# Patient Record
Sex: MALE | Race: WHITE | URBAN - METROPOLITAN AREA
[De-identification: names, ages, dates, MRNs, and addresses within clinical notes are randomized per-mention and may not be internally consistent; named-entity substitution may affect disease eponyms.]

---

## 2018-03-28 ENCOUNTER — TRANSFERRED RECORDS (OUTPATIENT)
Dept: HEALTH INFORMATION MANAGEMENT | Facility: CLINIC | Age: 63
End: 2018-03-28

## 2018-03-30 ENCOUNTER — OFFICE VISIT (OUTPATIENT)
Dept: NEUROSURGERY | Facility: CLINIC | Age: 63
End: 2018-03-30
Attending: NEUROLOGICAL SURGERY
Payer: COMMERCIAL

## 2018-03-30 VITALS
BODY MASS INDEX: 23 KG/M2 | RESPIRATION RATE: 16 BRPM | TEMPERATURE: 97.7 F | OXYGEN SATURATION: 96 % | HEIGHT: 71 IN | DIASTOLIC BLOOD PRESSURE: 58 MMHG | HEART RATE: 78 BPM | SYSTOLIC BLOOD PRESSURE: 144 MMHG | WEIGHT: 164.3 LBS

## 2018-03-30 DIAGNOSIS — C71.1 MALIGNANT NEOPLASM OF FRONTAL LOBE OF BRAIN (H): Primary | ICD-10-CM

## 2018-03-30 DIAGNOSIS — G93.89 BRAIN MASS: Primary | ICD-10-CM

## 2018-03-30 PROCEDURE — G0463 HOSPITAL OUTPT CLINIC VISIT: HCPCS

## 2018-03-30 RX ORDER — CYCLOSPORINE 100 MG/1
25 CAPSULE, GELATIN COATED ORAL 2 TIMES DAILY
Status: ON HOLD | COMMUNITY
End: 2018-04-18

## 2018-03-30 ASSESSMENT — PAIN SCALES - GENERAL: PAINLEVEL: NO PAIN (0)

## 2018-03-30 NOTE — MR AVS SNAPSHOT
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  Normal or non-critical lab and imaging results will be communicated to you by Enpockethart, letter or phone within 4 business days after the clinic has received the results. If you do not hear from us within 7 days, please contact the clinic through Enpockethart or phone. If you have a critical or abnormal lab result, we will notify you by phone as soon as possible.  Submit refill requests through Viajala or call your pharmacy and they will forward the refill request to us. Please allow 3 business days for your refill to be completed.          Additional Information About Your Visit        MyChart Information     Viajala lets you send messages to your doctor, view your test results, renew your prescriptions, schedule appointments and more. To sign up, go to [email or web address] . Click on \"Log in\" on the left side of the screen, which will take you to the Welcome page. Then click on \"Sign up Now\" on the right side of the page.     You will be asked to enter the access code listed below, as well as some personal information. Please follow the directions to create your username and password.     Your access code is: [email or web address]  Expires: 2018  6:30 AM     Your access code will  in 90 days. If you need help or a new code, please call your [facility] clinic or [phone].        Care EveryWhere ID     This is your Care " "EveryWhere ID. This could be used by other organizations to access your [facility] medical records  [id number]        Your Vitals Were     Pulse Temperature Respirations Height Pulse Oximetry BMI (Body Mass Index)    78 97.7  F (36.5  C) (Oral) 16 1.791 m (5' 10.5\") 96% 23.24 kg/m2       Blood Pressure from Last 3 Encounters:   03/30/18 144/58    Weight from Last 3 Encounters:   03/30/18 74.5 kg (164 lb 4.8 oz)              We Performed the Following     Mandie-Operative Worksheet, Single Procedure        Primary Care Provider Fax #    Physician No Ref-Primary [phone]       No address on file        Equal Access to Services     ADELAIDE PIERCE : Mayra Grewal, magdalena bird, frances muniz, love christianson . So [facility] [phone].    ATENCIÓN: Si habla español, tiene a sanchez disposición servicios gratuitos de asistencia lingüística. Llame al [phone].    We comply with applicable federal civil rights laws and [place] laws. We do not discriminate on the basis of race, color, national origin, age, disability, sex, sexual orientation, or gender identity.            Thank you!     Thank you for choosing [facility] CANCER [facility]  for your care. Our goal is always to provide you with excellent care. Hearing back from our patients is one way we can continue to improve our services. Please take a few minutes to complete the written survey that you may receive in the mail after your visit with us. Thank you!             Your Updated Medication List - Protect others around you: Learn how to safely use, store and throw away your medicines at www.disposemymeds.org.          This list is accurate as of 3/30/18  4:18 PM.  Always use your most recent med list.                   Brand Name Dispense Instructions for use Diagnosis    AMLODIPINE BESYLATE PO      Take by mouth daily        cycloSPORINE 100 MG capsule    sandIMMUNE     Take 25 mg by mouth 2 times daily Two " tables in the AM, two tablets in PM        METOPROLOL TARTRATE PO      Take by mouth 2 times daily        ZANTAC PO

## 2018-03-30 NOTE — LETTER
"3/30/2018       RE: Sarabjit Calvin  [address]  [place] [address]     Dear Colleague,    Thank you for referring your patient, Sarabjit Calvin, to the [facility]. Please see a copy of my visit note below.    Neurosurgery Clinic    Evaluation for a left frontal tumor    62 RH M with several week history of speech slurring associated with right handed clumsiness. The patient underwent an MRI that reportedly showed a left frontal lesion. The patient was prescribed Decadron and referred for consideration of craniotomy.    Review of system is notable only as above.    PMH: autoimmune nephritis, hypertension, and hypercholestrolemia  Meds: Cyclosporin, Amlodipine, Metoprolol, statin (unclear as to type)  Allergy: None    /58 (BP Location: Left arm, Patient Position: Sitting, Cuff Size: Adult Regular)  Pulse 78  Temp 97.7  F (36.5  C) (Oral)  Resp 16  Ht 1.791 m (5' 10.5\")  Wt 74.5 kg (164 lb 4.8 oz)  SpO2 96%  BMI 23.24 kg/m2    On examination, the patient's speech is fluent but slurred. Able to perform simple and complex tasks. Intact to repetition and naming. EOMI. VFF. PERRL at 3 mm. Subtle right facial droop/uveal deviation. R hand  at 5-/5.     MRI from  3 x 2.9 x 2.4 cm CE+ lesion with surrounding edema in the left frontal area, involving the face/tongue region of the left motor strip. There is significant madi-lesional FLAIR signal abnormality.    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 The patient is symptomatic from this lesion, and surgical resection is warranted. However, since the lesion involves the motor strip, I think that an awake craniotomy with motor mapping is warranted. I have reviewed the recommendation with the patient and answered his questions. I will proceed to schedule the pre-operative assessment and the surgical resection. The patient is from [place] and will require coordination to minimize travel " burden.    >50% of the session was devoted to counseling. All questions were answered. The visit lasted ~65 minutes.     Again, thank you for allowing me to participate in the care of your patient.      Sincerely,    Miky Garcia MD

## 2018-03-30 NOTE — LETTER
Date:April 2, 2018      Patient was self referred, no letter generated. Do not send.        [facility] Health Information

## 2018-03-30 NOTE — NURSING NOTE
"Oncology Rooming Note    March 30, 2018 2:57 PM   Sarabjit Calvin is a 62 year old male who presents for:    Chief Complaint   Patient presents with     RECHECK     New- Left Broca's area tumor      Initial Vitals: /58 (BP Location: Left arm, Patient Position: Sitting, Cuff Size: Adult Regular)  Pulse 78  Temp 97.7  F (36.5  C) (Oral)  Resp 16  Ht 1.791 m (5' 10.5\")  Wt 74.5 kg (164 lb 4.8 oz)  SpO2 96%  BMI 23.24 kg/m2 Estimated body mass index is 23.24 kg/(m^2) as calculated from the following:    Height as of this encounter: 1.791 m (5' 10.5\").    Weight as of this encounter: 74.5 kg (164 lb 4.8 oz). Body surface area is 1.93 meters squared.  No Pain (0) Comment: Data Unavailable   No LMP for male patient.  Allergies reviewed: Yes  Medications reviewed: Yes    Medications: Medication refills not needed today.  Pharmacy name entered into EPIC: Data Unavailable    Clinical concerns: N/A    5 minutes for nursing intake (face to face time)     Isabel Garcia CMA              "

## 2018-03-30 NOTE — PROGRESS NOTES
"Neurosurgery Clinic    Evaluation for a left frontal tumor    62 RH M with several week history of speech slurring associated with right handed clumsiness. The patient underwent an MRI that reportedly showed a left frontal lesion. The patient was prescribed Decadron and referred for consideration of craniotomy.    Review of system is notable only as above.    PMH: autoimmune nephritis, hypertension, and hypercholestrolemia  Meds: Cyclosporin, Amlodipine, Metoprolol, statin (unclear as to type)  Allergy: None    /58 (BP Location: Left arm, Patient Position: Sitting, Cuff Size: Adult Regular)  Pulse 78  Temp 97.7  F (36.5  C) (Oral)  Resp 16  Ht 1.791 m (5' 10.5\")  Wt 74.5 kg (164 lb 4.8 oz)  SpO2 96%  BMI 23.24 kg/m2    On examination, the patient's speech is fluent but slurred. Able to perform simple and complex tasks. Intact to repetition and naming. EOMI. VFF. PERRL at 3 mm. Subtle right facial droop/uveal deviation. R hand  at 5-/5.     MRI from  3 x 2.9 x 2.4 cm CE+ lesion with surrounding edema in the left frontal area, involving the face/tongue region of the left motor strip. There is significant madi-lesional FLAIR signal abnormality.    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 The patient is symptomatic from this lesion, and surgical resection is warranted. However, since the lesion involves the motor strip, I think that an awake craniotomy with motor mapping is warranted. I have reviewed the recommendation with the patient and answered his questions. I will proceed to schedule the pre-operative assessment and the surgical resection. The patient is from [place] and will require coordination to minimize travel burden.    >50% of the session was devoted to counseling. All questions were answered. The visit lasted ~65 minutes.   "

## 2018-04-05 ENCOUNTER — CARE COORDINATION (OUTPATIENT)
Dept: ONCOLOGY | Facility: CLINIC | Age: 63
End: 2018-04-05

## 2018-04-05 NOTE — PROGRESS NOTES
Placed call to patient to answer any questions related to upcoming surgery. Patient has questions regarding cost of surgery. Informed patient finical team will be doing prior authorization and get approval for surgery. Patient was happy with this information. He states he had no questions in regards to surgery or recovery. Also voiced understanding of all dates and time. Patient states he has no further questions or needs at this time.

## 2018-04-17 ENCOUNTER — OFFICE VISIT (OUTPATIENT)
Dept: SURGERY | Facility: CLINIC | Age: 63
End: 2018-04-17
Payer: COMMERCIAL

## 2018-04-17 ENCOUNTER — APPOINTMENT (OUTPATIENT)
Dept: SURGERY | Facility: CLINIC | Age: 63
End: 2018-04-17
Payer: COMMERCIAL

## 2018-04-17 ENCOUNTER — ANESTHESIA EVENT (OUTPATIENT)
Dept: SURGERY | Facility: CLINIC | Age: 63
End: 2018-04-17
Payer: COMMERCIAL

## 2018-04-17 ENCOUNTER — HOSPITAL ENCOUNTER (OUTPATIENT)
Dept: MRI IMAGING | Facility: CLINIC | Age: 63
Discharge: HOME | End: 2018-04-17
Attending: NURSE PRACTITIONER | Admitting: NURSE PRACTITIONER
Payer: COMMERCIAL

## 2018-04-17 ENCOUNTER — ALLIED HEALTH/NURSE VISIT (OUTPATIENT)
Dept: SURGERY | Facility: CLINIC | Age: 63
End: 2018-04-17
Payer: COMMERCIAL

## 2018-04-17 VITALS
HEIGHT: 71 IN | BODY MASS INDEX: 22.12 KG/M2 | WEIGHT: 158 LBS | HEART RATE: 78 BPM | OXYGEN SATURATION: 97 % | DIASTOLIC BLOOD PRESSURE: 75 MMHG | TEMPERATURE: 98.9 F | SYSTOLIC BLOOD PRESSURE: 169 MMHG

## 2018-04-17 DIAGNOSIS — Z01.818 PREOP EXAMINATION: Primary | ICD-10-CM

## 2018-04-17 DIAGNOSIS — C71.1 MALIGNANT NEOPLASM OF FRONTAL LOBE OF BRAIN (H): ICD-10-CM

## 2018-04-17 DIAGNOSIS — G93.89 BRAIN MASS: ICD-10-CM

## 2018-04-17 LAB
ABO + RH BLD: NORMAL
ABO + RH BLD: NORMAL
ANION GAP SERPL CALCULATED.3IONS-SCNC: 7 MMOL/L (ref 3–14)
BLD GP AB SCN SERPL QL: NORMAL
BLOOD BANK CMNT PATIENT-IMP: NORMAL
BUN SERPL-MCNC: 16 MG/DL (ref 7–30)
CALCIUM SERPL-MCNC: 8.3 MG/DL (ref 8.5–10.1)
CHLORIDE SERPL-SCNC: 104 MMOL/L (ref 94–109)
CO2 SERPL-SCNC: 27 MMOL/L (ref 20–32)
CREAT SERPL-MCNC: 0.83 MG/DL (ref 0.66–1.25)
ERYTHROCYTE [DISTWIDTH] IN BLOOD BY AUTOMATED COUNT: 13 % (ref 10–15)
GFR SERPL CREATININE-BSD FRML MDRD: >90 ML/MIN/1.7M2
GLUCOSE SERPL-MCNC: 95 MG/DL (ref 70–99)
HCT VFR BLD AUTO: 40.3 % (ref 40–53)
HGB BLD-MCNC: 13.7 G/DL (ref 13.3–17.7)
INR PPP: 0.96 (ref 0.86–1.14)
MCH RBC QN AUTO: 30.5 PG (ref 26.5–33)
MCHC RBC AUTO-ENTMCNC: 34 G/DL (ref 31.5–36.5)
MCV RBC AUTO: 90 FL (ref 78–100)
PLATELET # BLD AUTO: 196 10E9/L (ref 150–450)
POTASSIUM SERPL-SCNC: 4.4 MMOL/L (ref 3.4–5.3)
RBC # BLD AUTO: 4.49 10E12/L (ref 4.4–5.9)
SODIUM SERPL-SCNC: 137 MMOL/L (ref 133–144)
SPECIMEN EXP DATE BLD: NORMAL
WBC # BLD AUTO: 12 10E9/L (ref 4–11)

## 2018-04-17 PROCEDURE — 25000128 H RX IP 250 OP 636: Performed by: NURSE PRACTITIONER

## 2018-04-17 PROCEDURE — A9585 GADOBUTROL INJECTION: HCPCS | Performed by: NURSE PRACTITIONER

## 2018-04-17 PROCEDURE — 70552 MRI BRAIN STEM W/DYE: CPT

## 2018-04-17 RX ORDER — GADOBUTROL 604.72 MG/ML
7.5 INJECTION INTRAVENOUS ONCE
Status: COMPLETED | OUTPATIENT
Start: 2018-04-17 | End: 2018-04-17

## 2018-04-17 RX ORDER — POTASSIUM CHLORIDE 20MEQ/15ML
20 LIQUID (ML) ORAL
COMMUNITY
End: 2018-04-17

## 2018-04-17 RX ADMIN — GADOBUTROL 7.5 ML: 604.72 INJECTION INTRAVENOUS at 13:16

## 2018-04-17 ASSESSMENT — PAIN SCALES - GENERAL: PAINLEVEL: NO PAIN (0)

## 2018-04-17 NOTE — MR AVS SNAPSHOT
After Visit Summary   2018    Sarabjit Calvin    MRN: [id number]           Patient Information     Date Of Birth          1955        Visit Information        Provider Department      2018 2:30 PM Rn, [facility] Preoperative Assessment Center        Care Instructions    Preparing for Your Surgery      Name:  Sarabjit Calvin   MRN:  [id number]   :  1955   Today's Date:  2018     Arriving for surgery:  Surgery date:  18  Arrival time:  5:45AM  Please come to:       [facility] [address]  [address] [place]  [address]    -   parking is available in front of the hospital from 5:15 am to 8:00 pm    -  Stop at the Information Desk in the lobby    -   Inform the information person that you are here for surgery. An escort to 3c will be provided. If you would not like an escort, please proceed to 3C on the 3rd floor. [phone]     What can I eat or drink?  -  You may have solid food or milk products until 8 hours prior to your surgery. 11:45PM  -  You may have water, apple juice or 7up/Sprite until 2 hours prior to your surgery. 5:45AM    Which medicines can I take?  -  Do NOT take these medications in the morning, the day of surgery:  Please do not take potassium and bumetanide the morning of the procedure.     -  Please take these medications the day of surgery:  Please take metoprolol, atorvastatin and cyclosporine the morning of surgery.     How do I prepare myself?  -  Take two showers: one the night before surgery; and one the morning of surgery.         Use Scrubcare or Hibiclens to wash from neck down.  You may use your own shampoo and conditioner. No other hair products.   -  Do NOT use lotion, powder, deodorant, or antiperspirant the day of your surgery.  -  Do NOT wear any jewelry.  -Do not bring your own medications to the hospital, except for inhalers and eye drops.  -  Bring your ID and insurance  card.    Questions or Concerns:  If you have questions or concerns regarding the day of surgery, please call the Preoperative Assessment Center (PAC), Monday-Friday 7AM-7PM:  [phone].  After surgery please call your surgeons office.           AFTER YOUR SURGERY  Breathing exercises   Breathing exercises help you recover faster. Take deep breaths and let the air out slowly. This will:     Help you wake up after surgery.    Help prevent complications like pneumonia.  Preventing complications will help you go home sooner.   We may give you a breathing device (incentive spirometer) to encourage you to breathe deeply.   Nausea and vomiting   You may feel sick to your stomach after surgery; if so, let your nurse know.    Pain control:  After surgery, you may have pain. Our goal is to help you manage your pain. Pain medicine will help you feel comfortable enough to do activities that will help you heal.  These activities may include breathing exercises, walking and physical therapy.   To help your health care team treat your pain we will ask: 1) If you have pain  2) where it is located 3) describe your pain in your words  Methods of pain control include medications given by mouth, vein or by nerve block for some surgeries.  We may give you a pain control pump that will:  1) Deliver the medicine through a tube placed in your vein  2) Control the amount of medicine you receive  3) Allow you to push a button to deliver a dose of pain medicine  Sequential Compression Device (SCD) or Pneumo Boots:  You may need to wear SCD S on your legs or feet. These are wraps connected to a machine that pumps in air and releases it. The repeated pumping helps prevent blood clots from forming.           Follow-ups after your visit        Your next 10 appointments already scheduled     Apr 17, 2018  4:30 PM CDT   (Arrive by 4:15 PM)   PAC Anesthesia Consult with  Pac Anesthesiologist   [facility] Preoperative Assessment Center ([facility]  [facility])    [address]  4th Floor  [facility] [id number] [phone]            Apr 17, 2018  4:45 PM CDT   LAB with UC LAB   [facility] ([facility])    [address]  1st Floor  [facility] [id number] [phone]           Please do not eat 10-12 hours before your appointment if you are coming in fasting for labs on lipids, cholesterol, or glucose (sugar). This does not apply to pregnant women. Water, hot tea and black coffee (with nothing added) are okay. Do not drink other fluids, diet soda or chew gum.            Apr 18, 2018   Procedure with Miky Garcia MD   [facility], [place], Same Day Surgery (--)    [address] [address] [phone]              Future tests that were ordered for you today     Open Future Orders        Priority Expected Expires Ordered    ABO/Rh type and screen Routine 4/17/2018 5/17/2018 4/17/2018    CBC with platelets Routine 4/17/2018 5/17/2018 4/17/2018    Basic metabolic panel Routine 4/17/2018 5/17/2018 4/17/2018    INR Routine 4/17/2018 5/17/2018 4/17/2018            Who to contact     Please call your clinic at [phone] to:    Ask questions about your health    Make or cancel appointments    Discuss your medicines    Learn about your test results    Speak to your doctor            Additional Information About Your Visit        Odd Geology Information     O[employer] Ge[employer] gives you secure access to your electronic health record. If you see a primary care provider, you can also send messages to your care team and make appointments. If you have questions, please call your primary care clinic.  If you do not have a primary care provider, please call [phone] and they will assist you.      O[employer] is an electronic gateway that provides easy, online access to your medical records. With [employer], you can request a clinic appointment, read your test results, renew a prescription or communicate with  your care team.     To access your existing account, please contact your [facility] or call [phone] for assistance.        Care EveryWhere ID     This is your Care EveryWhere ID. This could be used by other organizations to access your [facility] medical records  [id number]         Blood Pressure from Last 3 Encounters:   04/17/18 169/75   03/30/18 144/58    Weight from Last 3 Encounters:   04/17/18 71.7 kg (158 lb)   03/30/18 74.5 kg (164 lb 4.8 oz)              Today, you had the following     No orders found for display       Primary Care Provider Fax #    Physician No Ref-Primary [phone]       No address on file        Equal Access to Services     ADELAIDE PIERCE : Mayra Grewal, magdalena bird, frances muniz, love christianson . So [facility] [phone].    ATENCIÓN: Si habla español, tiene a sanchez disposición servicios gratuitos de asistencia lingüística. Llame al [phone].    We comply with applicable federal civil rights laws and [place] laws. We do not discriminate on the basis of race, color, national origin, age, disability, sex, sexual orientation, or gender identity.            Thank you!     Thank you for choosing [facility] PREOPERATIVE ASSESSMENT CENTER  for your care. Our goal is always to provide you with excellent care. Hearing back from our patients is one way we can continue to improve our services. Please take a few minutes to complete the written survey that you may receive in the mail after your visit with us. Thank you!             Your Updated Medication List - Protect others around you: Learn how to safely use, store and throw away your medicines at www.disposemymeds.org.          This list is accurate as of 4/17/18  3:35 PM.  Always use your most recent med list.                   Brand Name Dispense Instructions for use Diagnosis    BUMETANIDE PO      Take 1 mg by mouth 2 times daily         cycloSPORINE 100 MG capsule    sandIMMUNE     Take 25 mg by mouth 2 times daily Two tables in the AM, two tablets in PM        LIPITOR PO      Take 10 mg by mouth every morning        METOPROLOL TARTRATE PO      Take 25 mg by mouth 3 times daily        POTASSIUM CHLORIDE PO      Take 20 mEq by mouth every morning

## 2018-04-17 NOTE — PATIENT INSTRUCTIONS
Preparing for Your Surgery      Name:  Sarabjit Calvin   MRN:  [id number]   :  1955   Today's Date:  2018     Arriving for surgery:  Surgery date:  18  Arrival time:  5:45AM  Please come to:       [facility] [address]  [address] [place]  [address]    -   parking is available in front of the hospital from 5:15 am to 8:00 pm    -  Stop at the Information Desk in the lobby    -   Inform the information person that you are here for surgery. An escort to 3c will be provided. If you would not like an escort, please proceed to 3C on the 3rd floor. [phone]     What can I eat or drink?  -  You may have solid food or milk products until 8 hours prior to your surgery. 11:45PM  -  You may have water, apple juice or 7up/Sprite until 2 hours prior to your surgery. 5:45AM    Which medicines can I take?  -  Do NOT take these medications in the morning, the day of surgery:  Please do not take potassium and bumetanide the morning of the procedure.     -  Please take these medications the day of surgery:  Please take metoprolol, atorvastatin and cyclosporine the morning of surgery.     How do I prepare myself?  -  Take two showers: one the night before surgery; and one the morning of surgery.         Use Scrubcare or Hibiclens to wash from neck down.  You may use your own shampoo and conditioner. No other hair products.   -  Do NOT use lotion, powder, deodorant, or antiperspirant the day of your surgery.  -  Do NOT wear any jewelry.  -Do not bring your own medications to the hospital, except for inhalers and eye drops.  -  Bring your ID and insurance card.    Questions or Concerns:  If you have questions or concerns regarding the day of surgery, please call the Preoperative Assessment Center (PAC), Monday-Friday 7AM-7PM:  [phone].  After surgery please call your surgeons office.           AFTER YOUR SURGERY  Breathing exercises   Breathing exercises help  you recover faster. Take deep breaths and let the air out slowly. This will:     Help you wake up after surgery.    Help prevent complications like pneumonia.  Preventing complications will help you go home sooner.   We may give you a breathing device (incentive spirometer) to encourage you to breathe deeply.   Nausea and vomiting   You may feel sick to your stomach after surgery; if so, let your nurse know.    Pain control:  After surgery, you may have pain. Our goal is to help you manage your pain. Pain medicine will help you feel comfortable enough to do activities that will help you heal.  These activities may include breathing exercises, walking and physical therapy.   To help your health care team treat your pain we will ask: 1) If you have pain  2) where it is located 3) describe your pain in your words  Methods of pain control include medications given by mouth, vein or by nerve block for some surgeries.  We may give you a pain control pump that will:  1) Deliver the medicine through a tube placed in your vein  2) Control the amount of medicine you receive  3) Allow you to push a button to deliver a dose of pain medicine  Sequential Compression Device (SCD) or Pneumo Boots:  You may need to wear SCD S on your legs or feet. These are wraps connected to a machine that pumps in air and releases it. The repeated pumping helps prevent blood clots from forming.

## 2018-04-17 NOTE — H&P
Pre-Operative H & P     CC:  Preoperative exam to assess for increased cardiopulmonary risk while undergoing surgery and anesthesia.    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 Garcia for  a Malignant neoplasm of frontal lobe of brain at the [facility].     Mr. Calvin in the last month started slurring his speech and had right hand weakness.  He was starting to drop items from his right hand.  He never had symptoms like this before. He underwent a stroke protocol that resulted in an MRI scan of the brain and it showed a neoplasm of the frontal lobe, so the above surgery has been recommended.  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  He has never been a smoker.  He does have interstitial nephritis and is on cyclosporine      History is obtained from the patient and  medical record including Care Everywhere.        Past Medical History  Past Medical History:   Diagnosis Date     Arthritis      GERD (gastroesophageal reflux disease)      Hyperlipidemia      Hypertension      Kidney disorder     Patient takes cyclosporin for an autoimmune kidney disorder, but doen't recall diagnosis and no records        Past Surgical History  Past Surgical History:   Procedure Laterality Date     ESOPHAGOGASTRODUODENOSCOPY  2011       Hx of Blood transfusions/reactions: No transfusion history       Personal or FH with difficulty with Anesthesia:  None    Prior to Admission Medications  Current Outpatient Prescriptions   Medication Sig Dispense Refill     Atorvastatin Calcium  (LIPITOR PO) Take 10 mg by mouth every morning        BUMETANIDE PO Take 1 mg by mouth 2 times daily       cycloSPORINE (SANDIMMUNE) 100 MG capsule Take 25 mg by mouth 2 times daily Two tables in the AM, two tablets in PM       METOPROLOL TARTRATE PO Take 25 mg by mouth 3 times daily        POTASSIUM CHLORIDE PO Take 20 mEq by mouth every morning           Allergies  Review of patient's allergies indicates no known allergies.     Social History  Social History     Social History     Marital status:      Spouse name: N/A     Number of children: N/A     Years of education: N/A     Occupational History     Not on file.     Social History Main Topics     Smoking status: Never Smoker     Smokeless tobacco: Never Used     Alcohol use No     Drug use: Not on file     Sexual activity: Not on file     Other Topics Concern     Not on file     Social History Narrative          Family History  No family history of bleeding, clotting disorders or complications with anesthesia.      ROS   The complete review of systems is negative other than noted in the HPI or here.   Constitutional: Denies  fevers/chills.    EENT: Denies difficulty swallowing.  Cardiovascular: Denies pain, tightness or squeezing in chest, upper abdomen, shoulder, or neck.  Denies ALMANZAR or orthopnea, palpitations or syncope.  Respiratory: Denies significant shortness of breath or cough.    GI: Denies frequent heartburn, nausea/vomiting     : Denies dysuria   Musculoskeletal: arthritis, Denies joint  swelling.    Skin: Denies rashes, infection or wounds.    Hematologic: Denies prolonged bleeding, anemia or blood clot history  Neurologic: Denies history of stroke, TIA, migraines, seizures, dizziness, numbness/tingling  Psychiatric: Denies changes in mood or affect.      Cardiology Tests: (personally reviewed):   Review Results Below in A/P    Labs: (personally reviewed):  No results found for: WBC, HGB, IRON, IRONSAT, HCT, PLT, INR, PTT, NA, POTASSIUM,  "RICHARD, GLC, CR, GFR, BUN, PO4, CO2, ALT, AST, BILITOTAL, ALKPHOS       Physical Exam:  No LMP for male patient.   Vital signs:  /75  Pulse 78  Temp 98.9  F (37.2  C)  Ht 1.791 m (5' 10.51\")  Wt 71.7 kg (158 lb)  SpO2 97%  BMI 22.34 kg/m2    Constitutional: Awake, alert, cooperative, no apparent distress, and appears stated age.  Eyes: Pupils equal, round and reactive to light, sclera clear, conjunctiva normal.  HENT: Normocephalic, oral pharynx with moist mucus membranes. No goiter appreciated.   Respiratory: Clear to auscultation bilaterally, no crackles or wheezing.  Cardiovascular: Regular rate and rhythm and no overt murmur noted.  No carotid bruits auscultated. No edema. Palpable pulses to radial  DP and PT arteries.   GI: Normal bowel sounds, soft, non-distended, non-tender, no masses palpated  Skin: Warm and dry.  No rashes at anticipated surgical site.   Musculoskeletal: Full extension of the neck.  No redness, warmth, or swelling of the joints noted. Gross motor strength is normal.    Neurologic: Awake, alert, oriented to name, place and time.  Gait is normal.   Neuropsychiatric: Calm, cooperative. Normal affect.     Assessment/Plan  Sarabjit Calvin is a 62 year old male who presents for pre-operative H & P in preparation for a Left craniotomy for tumor resection, awake on 4/18/18 with Dr. Garcia for  a Malignant neoplasm of frontal lobe of brain at the [facility].    PAC referral for risk assessment and optimization for anesthesia with comorbid conditions of:    Pre-operative considerations:  1.  Cardiac:  Functional status METS >4    Risk of Major Adverse Cardiac event: 0.4%  -HTN controlled with metoprolol(take DOS) and bumetanide(hold DOS)  2.  Pulm:   ELAINE risk:  low  -No known pulmonary disease    3.  Renal:  -Interstitial nephritis, patient on cyclosporine  3.  GI:  Risk of PONV score =2 .  If > 2, anti-emetic intervention recommended.    Discussed " the above A/P with Dr. Bazzi.  Patient is optimized and is an acceptable candidate for the proposed procedure.  No further diagnostic evaluation is needed.      AVS given to patient regarding medication instructions,  surgery time/arrival time and NPO status.  Shaunna Henning MS PA-C   Preoperative Assessment Center  [facility]  [facility]  Phone: [phone]  Fax: [phone]

## 2018-04-17 NOTE — ANESTHESIA PREPROCEDURE EVALUATION
Anesthesia Evaluation     . Pt has had prior anesthetic. Type: MAC    No history of anesthetic complications          ROS/MED HX    ENT/Pulmonary:  - neg pulmonary ROS     Neurologic:  - neg neurologic ROS     Cardiovascular:  - neg cardiovascular ROS   (+) Dyslipidemia, hypertension----. : . . . :. . No previous cardiac testing       METS/Exercise Tolerance:  >4 METS   Hematologic:  - neg hematologic  ROS       Musculoskeletal:   (+) arthritis, , , -       GI/Hepatic:     (+) GERD Asymptomatic on medication,       Renal/Genitourinary: Comment: Interstitial nephritis, On cyclosporine     (+) chronic renal disease, Pt does not require dialysis, Pt has no history of transplant,       Endo:  - neg endo ROS       Psychiatric:  - neg psychiatric ROS       Infectious Disease:  - neg infectious disease ROS       Malignancy:   (+) Malignancy History of Other  Other CA brain Active status post         Other:    (+) no H/O Chronic Pain,                   Physical Exam  Normal systems: cardiovascular and pulmonary    Airway   Mallampati: I  TM distance: <3 FB  Neck ROM: full    Dental   (+) caps and missing  Comment: Lower removable bridge    Cardiovascular   Rhythm and rate: regular and normal      Pulmonary    breath sounds clear to auscultation               PAC Discussion and Assessment    ASA Classification: 3  Case is suitable for: Gilbert  Anesthetic techniques and relevant risks discussed:   Invasive monitoring and risk discussed:   Types:   Possibility and Risk of blood transfusion discussed:   NPO instructions given:   Additional anesthetic preparation and risks discussed:   Needs early admission to pre-op area:   Other:     PAC Resident/NP Anesthesia Assessment:  Sarabjit Calvin is a 62 year old male who presents for pre-operative H & P in preparation for a Left craniotomy for tumor resection, awake on 4/18/18 with Dr. Garcia for  a Malignant neoplasm of frontal lobe of brain at the [facility]  [place] [facility].    PAC referral for risk assessment and optimization for anesthesia with comorbid conditions of:    Pre-operative considerations:  1.  Cardiac:  Functional status METS >4    Risk of Major Adverse Cardiac event: 0.4%  -HTN controlled with metoprolol(take DOS) and bumetanide(hold DOS)  2.  Pulm:   ELAINE risk:  low  -No known pulmonary disease    3.  Renal:  -Interstitial nephritis, patient on cyclosporine  3.  GI:  Risk of PONV score =2 .  If > 2, anti-emetic intervention recommended.    Discussed the above A/P with Dr. Bazzi.  Patient is optimized and is an acceptable candidate for the proposed procedure.  No further diagnostic evaluation is needed.    Shaunna GREGORY-KE  04/17/18 3:51 PM        Mid-Level Provider/Resident:   Date:   Time:     Attending Anesthesiologist Anesthesia Assessment:  62 year old for left AWAKE craniotomy for tumor resection (malignant neoplasm, frontal lobe). Chart reviewed, patient seen and evaluated; agree with above assessment. Patient had right sided weakness and slurred speech, now improved with decadron. The tumor is very close to Broca's area, so desire is to have patient awake and talking during tumor resection. Plan is for dexmetatomidine, propofol during bone flap - would suggest to Dr. Garcia consider Xparel so that scalp continues to be numb thorough the case and remains numb for closure. Explained all to the patient, he is comfortable with the plan. Would set up to be on patient's right side if possible to be able to test right sided strength. His  was firm today, but he kept opening and flexing that hand so there may be some residual neurological issue (tingling?). No significant cardiac or pulmonary disease.    Interstitial, autoimmune nephritis, on cyclosporine.    Patient is appropriate for the planned procedure without further workup or medical management change. The final anesthesia plan will be determined by the physician anesthesiologist  caring for the patient on the day of surgery.      Reviewed and Signed by PAC Anesthesiologist  Anesthesiologist: alfonzo  Date: 4/17/2018  Time:   Pass/Fail: Pass  Disposition:     PAC Pharmacist Assessment:        Pharmacist:   Date:   Time:      Anesthesia Plan      History & Physical Review  History and physical reviewed and following examination; no interval change.    ASA Status:  3 .    NPO Status:  > 8 hours    Plan for MAC with Intravenous induction. Maintenance will be TIVA.  Reason for MAC:  Deep or markedly invasive procedure (G8) and Other - see comments (Need for ongoing neurological assessment)  PONV prophylaxis:  Ondansetron (or other 5HT-3) and Dexamethasone or Solumedrol  Additional equipment: 2nd IV and Arterial Line - ASA 3  - MAC with standard ASA monitors, TIVA  - PIV  - Antibiotics per surgery  - PONV prophylaxis  - Pain management with multimodal analgesia.    Thuy Tidwell MD PGY4        Postoperative Care  Postoperative pain management:  IV analgesics.      Consents  Anesthetic plan, risks, benefits and alternatives discussed with:  Patient.  Use of blood products discussed: Yes.   Use of blood products discussed with Patient.  Consented to blood products.  .        62M with HTN, HLD, controlled GERD and brain tumor here for excision.  Due to location of tumor, procedure needs to be performed awake.  ASA 3.  Plan: MAC, PIV x2, art line.  Discussed risk of intraoperative awareness and patient agrees with plan and states he is comfortable.    Batool Manzano MD  Staff Anesthesiologist  Pager [phone]

## 2018-04-18 ENCOUNTER — DOCUMENTATION ONLY (OUTPATIENT)
Dept: NEUROSURGERY | Facility: CLINIC | Age: 63
End: 2018-04-18

## 2018-04-18 ENCOUNTER — HOSPITAL ENCOUNTER (INPATIENT)
Facility: CLINIC | Age: 63
LOS: 6 days | Discharge: NURSING, REHAB OR LONG-TERM CARE | End: 2018-04-24
Attending: NEUROLOGICAL SURGERY | Admitting: NEUROLOGICAL SURGERY
Payer: COMMERCIAL

## 2018-04-18 ENCOUNTER — ANESTHESIA (OUTPATIENT)
Dept: SURGERY | Facility: CLINIC | Age: 63
End: 2018-04-18
Payer: COMMERCIAL

## 2018-04-18 DIAGNOSIS — C71.9 GLIOBLASTOMA MULTIFORME (H): Primary | ICD-10-CM

## 2018-04-18 DIAGNOSIS — N11.9 INTERSTITIAL NEPHRITIS CHRONIC: ICD-10-CM

## 2018-04-18 DIAGNOSIS — I10 HYPERTENSION, UNSPECIFIED TYPE: ICD-10-CM

## 2018-04-18 DIAGNOSIS — E78.5 HYPERLIPIDEMIA, UNSPECIFIED HYPERLIPIDEMIA TYPE: ICD-10-CM

## 2018-04-18 PROBLEM — G93.9 BRAIN LESION: Status: ACTIVE | Noted: 2018-04-18

## 2018-04-18 LAB
GLUCOSE BLDC GLUCOMTR-MCNC: 138 MG/DL (ref 70–99)
MRSA DNA SPEC QL NAA+PROBE: NEGATIVE
SPECIMEN SOURCE: NORMAL

## 2018-04-18 PROCEDURE — 87640 STAPH A DNA AMP PROBE: CPT | Performed by: NEUROLOGICAL SURGERY

## 2018-04-18 PROCEDURE — 88275 CYTOGENETICS 100-300: CPT

## 2018-04-18 PROCEDURE — 25000125 ZZHC RX 250: Performed by: ANESTHESIOLOGY

## 2018-04-18 PROCEDURE — 81287 MGMT GENE PRMTR MTHYLTN ALYS: CPT | Performed by: NEUROLOGICAL SURGERY

## 2018-04-18 PROCEDURE — 25000128 H RX IP 250 OP 636: Performed by: STUDENT IN AN ORGANIZED HEALTH CARE EDUCATION/TRAINING PROGRAM

## 2018-04-18 PROCEDURE — 40000275 ZZH STATISTIC RCP TIME EA 10 MIN

## 2018-04-18 PROCEDURE — 88331 PATH CONSLTJ SURG 1 BLK 1SPC: CPT | Performed by: NEUROLOGICAL SURGERY

## 2018-04-18 PROCEDURE — 25000131 ZZH RX MED GY IP 250 OP 636 PS 637: Performed by: ANESTHESIOLOGY

## 2018-04-18 PROCEDURE — 36000076 ZZH SURGERY LEVEL 6 EA 15 ADDTL MIN - UMMC: Performed by: NEUROLOGICAL SURGERY

## 2018-04-18 PROCEDURE — 8E090CZ ROBOTIC ASSISTED PROCEDURE OF HEAD AND NECK REGION, OPEN APPROACH: ICD-10-PCS | Performed by: NEUROLOGICAL SURGERY

## 2018-04-18 PROCEDURE — 25000132 ZZH RX MED GY IP 250 OP 250 PS 637: Performed by: ANESTHESIOLOGY

## 2018-04-18 PROCEDURE — 37000008 ZZH ANESTHESIA TECHNICAL FEE, 1ST 30 MIN: Performed by: NEUROLOGICAL SURGERY

## 2018-04-18 PROCEDURE — 88271 CYTOGENETICS DNA PROBE: CPT

## 2018-04-18 PROCEDURE — 25000132 ZZH RX MED GY IP 250 OP 250 PS 637: Performed by: STUDENT IN AN ORGANIZED HEALTH CARE EDUCATION/TRAINING PROGRAM

## 2018-04-18 PROCEDURE — 88342 IMHCHEM/IMCYTCHM 1ST ANTB: CPT | Performed by: NEUROLOGICAL SURGERY

## 2018-04-18 PROCEDURE — 88307 TISSUE EXAM BY PATHOLOGIST: CPT | Performed by: NEUROLOGICAL SURGERY

## 2018-04-18 PROCEDURE — 25000131 ZZH RX MED GY IP 250 OP 636 PS 637: Performed by: STUDENT IN AN ORGANIZED HEALTH CARE EDUCATION/TRAINING PROGRAM

## 2018-04-18 PROCEDURE — 88271 CYTOGENETICS DNA PROBE: CPT | Performed by: NEUROLOGICAL SURGERY

## 2018-04-18 PROCEDURE — 00B70ZZ EXCISION OF CEREBRAL HEMISPHERE, OPEN APPROACH: ICD-10-PCS | Performed by: NEUROLOGICAL SURGERY

## 2018-04-18 PROCEDURE — C9248 INJ, CLEVIDIPINE BUTYRATE: HCPCS | Performed by: ANESTHESIOLOGY

## 2018-04-18 PROCEDURE — 87641 MR-STAPH DNA AMP PROBE: CPT | Performed by: NEUROLOGICAL SURGERY

## 2018-04-18 PROCEDURE — 25000128 H RX IP 250 OP 636: Performed by: ANESTHESIOLOGY

## 2018-04-18 PROCEDURE — 27210995 ZZH RX 272: Performed by: NEUROLOGICAL SURGERY

## 2018-04-18 PROCEDURE — 40000014 ZZH STATISTIC ARTERIAL MONITORING DAILY

## 2018-04-18 PROCEDURE — 20000004 ZZH R&B ICU UMMC

## 2018-04-18 PROCEDURE — C1713 ANCHOR/SCREW BN/BN,TIS/BN: HCPCS | Performed by: NEUROLOGICAL SURGERY

## 2018-04-18 PROCEDURE — 27810169 ZZH OR IMPLANT GENERAL: Performed by: NEUROLOGICAL SURGERY

## 2018-04-18 PROCEDURE — 71000017 ZZH RECOVERY PHASE 1 LEVEL 3 EA ADDTL HR: Performed by: NEUROLOGICAL SURGERY

## 2018-04-18 PROCEDURE — 00000159 ZZHCL STATISTIC H-SEND OUTS PREP: Performed by: NEUROLOGICAL SURGERY

## 2018-04-18 PROCEDURE — 25000125 ZZHC RX 250

## 2018-04-18 PROCEDURE — 40000170 ZZH STATISTIC PRE-PROCEDURE ASSESSMENT II: Performed by: NEUROLOGICAL SURGERY

## 2018-04-18 PROCEDURE — 36000074 ZZH SURGERY LEVEL 6 1ST 30 MIN - UMMC: Performed by: NEUROLOGICAL SURGERY

## 2018-04-18 PROCEDURE — C1763 CONN TISS, NON-HUMAN: HCPCS | Performed by: NEUROLOGICAL SURGERY

## 2018-04-18 PROCEDURE — 00000146 ZZHCL STATISTIC GLUCOSE BY METER IP

## 2018-04-18 PROCEDURE — 85027 COMPLETE CBC AUTOMATED: CPT | Performed by: STUDENT IN AN ORGANIZED HEALTH CARE EDUCATION/TRAINING PROGRAM

## 2018-04-18 PROCEDURE — 25000128 H RX IP 250 OP 636: Performed by: NEUROLOGICAL SURGERY

## 2018-04-18 PROCEDURE — 71000016 ZZH RECOVERY PHASE 1 LEVEL 3 FIRST HR: Performed by: NEUROLOGICAL SURGERY

## 2018-04-18 PROCEDURE — 25000125 ZZHC RX 250: Performed by: NEUROLOGICAL SURGERY

## 2018-04-18 PROCEDURE — 27210794 ZZH OR GENERAL SUPPLY STERILE: Performed by: NEUROLOGICAL SURGERY

## 2018-04-18 PROCEDURE — 88275 CYTOGENETICS 100-300: CPT | Performed by: NEUROLOGICAL SURGERY

## 2018-04-18 PROCEDURE — 37000009 ZZH ANESTHESIA TECHNICAL FEE, EACH ADDTL 15 MIN: Performed by: NEUROLOGICAL SURGERY

## 2018-04-18 DEVICE — GRAFT DURAGEN 3X3" ID330: Type: IMPLANTABLE DEVICE | Site: CRANIAL | Status: FUNCTIONAL

## 2018-04-18 DEVICE — IMP BUR HOLE COVER 17MM LOW PROFILE TI 421.527: Type: IMPLANTABLE DEVICE | Site: SKULL | Status: FUNCTIONAL

## 2018-04-18 DEVICE — IMP SCR SYN MATRIX LOW PRO 1.5X04MM SELF DRILL 04.503.104.01: Type: IMPLANTABLE DEVICE | Site: SKULL | Status: FUNCTIONAL

## 2018-04-18 RX ORDER — ONDANSETRON 2 MG/ML
4 INJECTION INTRAMUSCULAR; INTRAVENOUS EVERY 30 MIN PRN
Status: DISCONTINUED | OUTPATIENT
Start: 2018-04-18 | End: 2018-04-18 | Stop reason: HOSPADM

## 2018-04-18 RX ORDER — METOPROLOL TARTRATE 1 MG/ML
INJECTION, SOLUTION INTRAVENOUS
Status: COMPLETED
Start: 2018-04-18 | End: 2018-04-18

## 2018-04-18 RX ORDER — BUPIVACAINE HYDROCHLORIDE AND EPINEPHRINE 5; 5 MG/ML; UG/ML
INJECTION, SOLUTION PERINEURAL PRN
Status: DISCONTINUED | OUTPATIENT
Start: 2018-04-18 | End: 2018-04-18 | Stop reason: HOSPADM

## 2018-04-18 RX ORDER — ONDANSETRON 4 MG/1
4 TABLET, ORALLY DISINTEGRATING ORAL EVERY 30 MIN PRN
Status: DISCONTINUED | OUTPATIENT
Start: 2018-04-18 | End: 2018-04-18 | Stop reason: HOSPADM

## 2018-04-18 RX ORDER — ACETAMINOPHEN 325 MG/1
975 TABLET ORAL ONCE
Status: COMPLETED | OUTPATIENT
Start: 2018-04-18 | End: 2018-04-18

## 2018-04-18 RX ORDER — ACETAMINOPHEN 325 MG/1
975 TABLET ORAL EVERY 8 HOURS
Status: DISPENSED | OUTPATIENT
Start: 2018-04-18 | End: 2018-04-21

## 2018-04-18 RX ORDER — NITROGLYCERIN 10 MG/100ML
INJECTION INTRAVENOUS PRN
Status: DISCONTINUED | OUTPATIENT
Start: 2018-04-18 | End: 2018-04-18

## 2018-04-18 RX ORDER — MANNITOL 20 G/100ML
INJECTION, SOLUTION INTRAVENOUS PRN
Status: DISCONTINUED | OUTPATIENT
Start: 2018-04-18 | End: 2018-04-18

## 2018-04-18 RX ORDER — POTASSIUM CHLORIDE 750 MG/1
20-40 TABLET, EXTENDED RELEASE ORAL
Status: DISCONTINUED | OUTPATIENT
Start: 2018-04-18 | End: 2018-04-24 | Stop reason: HOSPADM

## 2018-04-18 RX ORDER — NALOXONE HYDROCHLORIDE 0.4 MG/ML
.1-.4 INJECTION, SOLUTION INTRAMUSCULAR; INTRAVENOUS; SUBCUTANEOUS
Status: ACTIVE | OUTPATIENT
Start: 2018-04-18 | End: 2018-04-19

## 2018-04-18 RX ORDER — LABETALOL HYDROCHLORIDE 5 MG/ML
10 INJECTION, SOLUTION INTRAVENOUS ONCE
Status: COMPLETED | OUTPATIENT
Start: 2018-04-18 | End: 2018-04-18

## 2018-04-18 RX ORDER — CEFAZOLIN SODIUM 1 G/3ML
1 INJECTION, POWDER, FOR SOLUTION INTRAMUSCULAR; INTRAVENOUS SEE ADMIN INSTRUCTIONS
Status: DISCONTINUED | OUTPATIENT
Start: 2018-04-18 | End: 2018-04-18 | Stop reason: HOSPADM

## 2018-04-18 RX ORDER — SODIUM CHLORIDE, SODIUM LACTATE, POTASSIUM CHLORIDE, CALCIUM CHLORIDE 600; 310; 30; 20 MG/100ML; MG/100ML; MG/100ML; MG/100ML
INJECTION, SOLUTION INTRAVENOUS CONTINUOUS PRN
Status: DISCONTINUED | OUTPATIENT
Start: 2018-04-18 | End: 2018-04-18

## 2018-04-18 RX ORDER — POTASSIUM CHLORIDE 7.45 MG/ML
10 INJECTION INTRAVENOUS
Status: DISCONTINUED | OUTPATIENT
Start: 2018-04-18 | End: 2018-04-24 | Stop reason: HOSPADM

## 2018-04-18 RX ORDER — HYDRALAZINE HYDROCHLORIDE 20 MG/ML
10 INJECTION INTRAMUSCULAR; INTRAVENOUS ONCE
Status: COMPLETED | OUTPATIENT
Start: 2018-04-18 | End: 2018-04-18

## 2018-04-18 RX ORDER — ONDANSETRON 2 MG/ML
4-8 INJECTION INTRAMUSCULAR; INTRAVENOUS EVERY 6 HOURS PRN
Status: DISCONTINUED | OUTPATIENT
Start: 2018-04-18 | End: 2018-04-24 | Stop reason: HOSPADM

## 2018-04-18 RX ORDER — LIDOCAINE 40 MG/G
CREAM TOPICAL
Status: DISCONTINUED | OUTPATIENT
Start: 2018-04-18 | End: 2018-04-18 | Stop reason: HOSPADM

## 2018-04-18 RX ORDER — LEVETIRACETAM 10 MG/ML
1000 INJECTION INTRAVASCULAR ONCE
Status: COMPLETED | OUTPATIENT
Start: 2018-04-18 | End: 2018-04-18

## 2018-04-18 RX ORDER — ONDANSETRON 4 MG/1
4-8 TABLET, ORALLY DISINTEGRATING ORAL EVERY 6 HOURS PRN
Status: DISCONTINUED | OUTPATIENT
Start: 2018-04-18 | End: 2018-04-24 | Stop reason: HOSPADM

## 2018-04-18 RX ORDER — ESMOLOL HYDROCHLORIDE 10 MG/ML
INJECTION INTRAVENOUS PRN
Status: DISCONTINUED | OUTPATIENT
Start: 2018-04-18 | End: 2018-04-18

## 2018-04-18 RX ORDER — DEXAMETHASONE SODIUM PHOSPHATE 10 MG/ML
4 INJECTION INTRAMUSCULAR; INTRAVENOUS EVERY 6 HOURS
Status: DISCONTINUED | OUTPATIENT
Start: 2018-04-18 | End: 2018-04-20

## 2018-04-18 RX ORDER — POLYETHYLENE GLYCOL 3350 17 G/17G
17 POWDER, FOR SOLUTION ORAL 3 TIMES DAILY
Status: DISCONTINUED | OUTPATIENT
Start: 2018-04-18 | End: 2018-04-24 | Stop reason: HOSPADM

## 2018-04-18 RX ORDER — SODIUM CHLORIDE, SODIUM LACTATE, POTASSIUM CHLORIDE, CALCIUM CHLORIDE 600; 310; 30; 20 MG/100ML; MG/100ML; MG/100ML; MG/100ML
INJECTION, SOLUTION INTRAVENOUS CONTINUOUS
Status: DISCONTINUED | OUTPATIENT
Start: 2018-04-18 | End: 2018-04-18 | Stop reason: HOSPADM

## 2018-04-18 RX ORDER — HYDROMORPHONE HYDROCHLORIDE 1 MG/ML
.3-.5 INJECTION, SOLUTION INTRAMUSCULAR; INTRAVENOUS; SUBCUTANEOUS EVERY 5 MIN PRN
Status: DISCONTINUED | OUTPATIENT
Start: 2018-04-18 | End: 2018-04-18 | Stop reason: HOSPADM

## 2018-04-18 RX ORDER — CYCLOSPORINE 25 MG/1
50 CAPSULE, GELATIN COATED ORAL
Status: DISCONTINUED | OUTPATIENT
Start: 2018-04-18 | End: 2018-04-24 | Stop reason: HOSPADM

## 2018-04-18 RX ORDER — AMOXICILLIN 250 MG
3 CAPSULE ORAL 2 TIMES DAILY
Status: DISCONTINUED | OUTPATIENT
Start: 2018-04-18 | End: 2018-04-24 | Stop reason: HOSPADM

## 2018-04-18 RX ORDER — DEXAMETHASONE SODIUM PHOSPHATE 4 MG/ML
INJECTION, SOLUTION INTRA-ARTICULAR; INTRALESIONAL; INTRAMUSCULAR; INTRAVENOUS; SOFT TISSUE PRN
Status: DISCONTINUED | OUTPATIENT
Start: 2018-04-18 | End: 2018-04-18

## 2018-04-18 RX ORDER — MAGNESIUM SULFATE HEPTAHYDRATE 40 MG/ML
4 INJECTION, SOLUTION INTRAVENOUS EVERY 4 HOURS PRN
Status: DISCONTINUED | OUTPATIENT
Start: 2018-04-18 | End: 2018-04-24 | Stop reason: HOSPADM

## 2018-04-18 RX ORDER — ACETAMINOPHEN 325 MG/1
650 TABLET ORAL EVERY 4 HOURS PRN
Status: DISCONTINUED | OUTPATIENT
Start: 2018-04-21 | End: 2018-04-24 | Stop reason: HOSPADM

## 2018-04-18 RX ORDER — PANTOPRAZOLE SODIUM 40 MG/1
40 TABLET, DELAYED RELEASE ORAL EVERY MORNING
Status: DISCONTINUED | OUTPATIENT
Start: 2018-04-19 | End: 2018-04-24 | Stop reason: HOSPADM

## 2018-04-18 RX ORDER — LEVETIRACETAM 750 MG/1
750 TABLET ORAL 2 TIMES DAILY
Status: DISCONTINUED | OUTPATIENT
Start: 2018-04-18 | End: 2018-04-24 | Stop reason: HOSPADM

## 2018-04-18 RX ORDER — HYDROMORPHONE HYDROCHLORIDE 1 MG/ML
.3-.5 INJECTION, SOLUTION INTRAMUSCULAR; INTRAVENOUS; SUBCUTANEOUS
Status: ACTIVE | OUTPATIENT
Start: 2018-04-18 | End: 2018-04-19

## 2018-04-18 RX ORDER — FENTANYL CITRATE 50 UG/ML
25-50 INJECTION, SOLUTION INTRAMUSCULAR; INTRAVENOUS
Status: DISCONTINUED | OUTPATIENT
Start: 2018-04-18 | End: 2018-04-18 | Stop reason: HOSPADM

## 2018-04-18 RX ORDER — HYDRALAZINE HYDROCHLORIDE 20 MG/ML
10-20 INJECTION INTRAMUSCULAR; INTRAVENOUS EVERY 30 MIN PRN
Status: DISCONTINUED | OUTPATIENT
Start: 2018-04-18 | End: 2018-04-20

## 2018-04-18 RX ORDER — LEVETIRACETAM 10 MG/ML
1000 INJECTION INTRAVASCULAR EVERY 12 HOURS
Status: CANCELLED | OUTPATIENT
Start: 2018-04-18

## 2018-04-18 RX ORDER — LIDOCAINE 40 MG/G
CREAM TOPICAL
Status: DISCONTINUED | OUTPATIENT
Start: 2018-04-18 | End: 2018-04-24 | Stop reason: HOSPADM

## 2018-04-18 RX ORDER — BUMETANIDE 1 MG/1
1 TABLET ORAL 2 TIMES DAILY
Status: DISCONTINUED | OUTPATIENT
Start: 2018-04-19 | End: 2018-04-24 | Stop reason: HOSPADM

## 2018-04-18 RX ORDER — CEFAZOLIN SODIUM 2 G/100ML
2 INJECTION, SOLUTION INTRAVENOUS
Status: DISCONTINUED | OUTPATIENT
Start: 2018-04-18 | End: 2018-04-18 | Stop reason: HOSPADM

## 2018-04-18 RX ORDER — DEXAMETHASONE 4 MG/1
4 TABLET ORAL EVERY 6 HOURS
Status: DISCONTINUED | OUTPATIENT
Start: 2018-04-18 | End: 2018-04-20

## 2018-04-18 RX ORDER — POTASSIUM CHLORIDE 29.8 MG/ML
20 INJECTION INTRAVENOUS
Status: DISCONTINUED | OUTPATIENT
Start: 2018-04-18 | End: 2018-04-24 | Stop reason: HOSPADM

## 2018-04-18 RX ORDER — DEXAMETHASONE 4 MG/1
4 TABLET ORAL EVERY 6 HOURS SCHEDULED
Status: CANCELLED | OUTPATIENT
Start: 2018-04-18

## 2018-04-18 RX ORDER — NALOXONE HYDROCHLORIDE 0.4 MG/ML
.1-.4 INJECTION, SOLUTION INTRAMUSCULAR; INTRAVENOUS; SUBCUTANEOUS
Status: DISCONTINUED | OUTPATIENT
Start: 2018-04-18 | End: 2018-04-18

## 2018-04-18 RX ORDER — ATORVASTATIN CALCIUM 10 MG/1
10 TABLET, FILM COATED ORAL EVERY MORNING
Status: DISCONTINUED | OUTPATIENT
Start: 2018-04-19 | End: 2018-04-24 | Stop reason: HOSPADM

## 2018-04-18 RX ORDER — LABETALOL HYDROCHLORIDE 5 MG/ML
INJECTION, SOLUTION INTRAVENOUS PRN
Status: DISCONTINUED | OUTPATIENT
Start: 2018-04-18 | End: 2018-04-18

## 2018-04-18 RX ORDER — OXYCODONE HYDROCHLORIDE 5 MG/1
5-10 TABLET ORAL
Status: DISCONTINUED | OUTPATIENT
Start: 2018-04-18 | End: 2018-04-20

## 2018-04-18 RX ORDER — PROCHLORPERAZINE MALEATE 10 MG
10 TABLET ORAL EVERY 6 HOURS PRN
Status: DISCONTINUED | OUTPATIENT
Start: 2018-04-18 | End: 2018-04-24 | Stop reason: HOSPADM

## 2018-04-18 RX ORDER — PANTOPRAZOLE SODIUM 40 MG/1
40 TABLET, DELAYED RELEASE ORAL EVERY MORNING
Status: CANCELLED | OUTPATIENT
Start: 2018-04-18

## 2018-04-18 RX ORDER — POTASSIUM CHLORIDE 1.5 G/1.58G
20-40 POWDER, FOR SOLUTION ORAL
Status: DISCONTINUED | OUTPATIENT
Start: 2018-04-18 | End: 2018-04-24 | Stop reason: HOSPADM

## 2018-04-18 RX ORDER — PROPOFOL 10 MG/ML
INJECTION, EMULSION INTRAVENOUS PRN
Status: DISCONTINUED | OUTPATIENT
Start: 2018-04-18 | End: 2018-04-18

## 2018-04-18 RX ORDER — MAGNESIUM HYDROXIDE 1200 MG/15ML
LIQUID ORAL PRN
Status: DISCONTINUED | OUTPATIENT
Start: 2018-04-18 | End: 2018-04-18 | Stop reason: HOSPADM

## 2018-04-18 RX ORDER — SODIUM CHLORIDE 9 MG/ML
INJECTION, SOLUTION INTRAVENOUS CONTINUOUS
Status: DISPENSED | OUTPATIENT
Start: 2018-04-18 | End: 2018-04-19

## 2018-04-18 RX ORDER — LABETALOL HYDROCHLORIDE 5 MG/ML
10 INJECTION, SOLUTION INTRAVENOUS
Status: COMPLETED | OUTPATIENT
Start: 2018-04-18 | End: 2018-04-18

## 2018-04-18 RX ORDER — NALOXONE HYDROCHLORIDE 0.4 MG/ML
.1-.4 INJECTION, SOLUTION INTRAMUSCULAR; INTRAVENOUS; SUBCUTANEOUS
Status: DISCONTINUED | OUTPATIENT
Start: 2018-04-18 | End: 2018-04-18 | Stop reason: HOSPADM

## 2018-04-18 RX ORDER — METOPROLOL TARTRATE 1 MG/ML
10 INJECTION, SOLUTION INTRAVENOUS EVERY 8 HOURS
Status: DISCONTINUED | OUTPATIENT
Start: 2018-04-18 | End: 2018-04-20

## 2018-04-18 RX ORDER — POTASSIUM CL/LIDO/0.9 % NACL 10MEQ/0.1L
10 INTRAVENOUS SOLUTION, PIGGYBACK (ML) INTRAVENOUS
Status: DISCONTINUED | OUTPATIENT
Start: 2018-04-18 | End: 2018-04-24 | Stop reason: HOSPADM

## 2018-04-18 RX ORDER — LABETALOL HYDROCHLORIDE 5 MG/ML
10-40 INJECTION, SOLUTION INTRAVENOUS EVERY 10 MIN PRN
Status: DISCONTINUED | OUTPATIENT
Start: 2018-04-18 | End: 2018-04-20

## 2018-04-18 RX ORDER — SODIUM CHLORIDE, SODIUM LACTATE, POTASSIUM CHLORIDE, CALCIUM CHLORIDE 600; 310; 30; 20 MG/100ML; MG/100ML; MG/100ML; MG/100ML
INJECTION, SOLUTION INTRAVENOUS CONTINUOUS
Status: DISCONTINUED | OUTPATIENT
Start: 2018-04-18 | End: 2018-04-18

## 2018-04-18 RX ORDER — LABETALOL HYDROCHLORIDE 5 MG/ML
5-10 INJECTION, SOLUTION INTRAVENOUS EVERY 10 MIN PRN
Status: COMPLETED | OUTPATIENT
Start: 2018-04-18 | End: 2018-04-18

## 2018-04-18 RX ORDER — HYDRALAZINE HYDROCHLORIDE 20 MG/ML
2.5-5 INJECTION INTRAMUSCULAR; INTRAVENOUS EVERY 10 MIN PRN
Status: DISCONTINUED | OUTPATIENT
Start: 2018-04-18 | End: 2018-04-18 | Stop reason: HOSPADM

## 2018-04-18 RX ORDER — METOPROLOL TARTRATE 25 MG/1
25 TABLET, FILM COATED ORAL 3 TIMES DAILY
Status: DISCONTINUED | OUTPATIENT
Start: 2018-04-18 | End: 2018-04-18

## 2018-04-18 RX ADMIN — CLEVIDIPINE 0.25 MG: 0.5 EMULSION INTRAVENOUS at 09:20

## 2018-04-18 RX ADMIN — REMIFENTANIL HYDROCHLORIDE 0.05 MCG/KG/MIN: 1 INJECTION, POWDER, LYOPHILIZED, FOR SOLUTION INTRAVENOUS at 08:07

## 2018-04-18 RX ADMIN — HYDRALAZINE HYDROCHLORIDE 5 MG: 20 INJECTION INTRAMUSCULAR; INTRAVENOUS at 15:50

## 2018-04-18 RX ADMIN — SODIUM CHLORIDE, POTASSIUM CHLORIDE, SODIUM LACTATE AND CALCIUM CHLORIDE: 600; 310; 30; 20 INJECTION, SOLUTION INTRAVENOUS at 07:35

## 2018-04-18 RX ADMIN — CLEVIDIPINE 0.25 MG: 0.5 EMULSION INTRAVENOUS at 09:45

## 2018-04-18 RX ADMIN — SENNOSIDES AND DOCUSATE SODIUM 3 TABLET: 8.6; 5 TABLET ORAL at 20:05

## 2018-04-18 RX ADMIN — HYDROMORPHONE HYDROCHLORIDE 0.5 MG: 1 INJECTION, SOLUTION INTRAMUSCULAR; INTRAVENOUS; SUBCUTANEOUS at 09:55

## 2018-04-18 RX ADMIN — PROPOFOL 40 MG: 10 INJECTION, EMULSION INTRAVENOUS at 09:46

## 2018-04-18 RX ADMIN — CLEVIDIPINE 0.25 MG: 0.5 EMULSION INTRAVENOUS at 09:35

## 2018-04-18 RX ADMIN — CLEVIDIPINE 1 MG/HR: 0.5 EMULSION INTRAVENOUS at 09:47

## 2018-04-18 RX ADMIN — Medication 10 MG: at 18:26

## 2018-04-18 RX ADMIN — ESMOLOL HYDROCHLORIDE 20 MG: 10 INJECTION, SOLUTION INTRAVENOUS at 08:50

## 2018-04-18 RX ADMIN — DEXAMETHASONE SODIUM PHOSPHATE 4 MG: 10 INJECTION, SOLUTION INTRAMUSCULAR; INTRAVENOUS at 20:16

## 2018-04-18 RX ADMIN — Medication 5 MG: at 14:28

## 2018-04-18 RX ADMIN — NITROGLYCERIN 50 MCG: 10 INJECTION INTRAVENOUS at 09:41

## 2018-04-18 RX ADMIN — HYDRALAZINE HYDROCHLORIDE 20 MG: 20 INJECTION INTRAMUSCULAR; INTRAVENOUS at 21:14

## 2018-04-18 RX ADMIN — MANNITOL 70 G: 20 INJECTION, SOLUTION INTRAVENOUS at 08:43

## 2018-04-18 RX ADMIN — DEXAMETHASONE SODIUM PHOSPHATE 4 MG: 10 INJECTION, SOLUTION INTRAMUSCULAR; INTRAVENOUS at 14:22

## 2018-04-18 RX ADMIN — HYDRALAZINE HYDROCHLORIDE 5 MG: 20 INJECTION INTRAMUSCULAR; INTRAVENOUS at 15:33

## 2018-04-18 RX ADMIN — NITROGLYCERIN 50 MCG: 10 INJECTION INTRAVENOUS at 08:58

## 2018-04-18 RX ADMIN — ACETAMINOPHEN 975 MG: 325 TABLET ORAL at 06:57

## 2018-04-18 RX ADMIN — NITROGLYCERIN 50 MCG: 10 INJECTION INTRAVENOUS at 09:00

## 2018-04-18 RX ADMIN — CLEVIDIPINE 0.25 MG: 0.5 EMULSION INTRAVENOUS at 09:29

## 2018-04-18 RX ADMIN — DEXAMETHASONE SODIUM PHOSPHATE 10 MG: 4 INJECTION, SOLUTION INTRA-ARTICULAR; INTRALESIONAL; INTRAMUSCULAR; INTRAVENOUS; SOFT TISSUE at 08:24

## 2018-04-18 RX ADMIN — METOPROLOL TARTRATE 10 MG: 5 INJECTION INTRAVENOUS at 17:20

## 2018-04-18 RX ADMIN — CLEVIDIPINE 0.25 MG: 0.5 EMULSION INTRAVENOUS at 09:30

## 2018-04-18 RX ADMIN — ESMOLOL HYDROCHLORIDE 30 MG: 10 INJECTION, SOLUTION INTRAVENOUS at 08:48

## 2018-04-18 RX ADMIN — MIDAZOLAM 0.5 MG: 1 INJECTION INTRAMUSCULAR; INTRAVENOUS at 09:05

## 2018-04-18 RX ADMIN — METOPROLOL TARTRATE 25 MG: 25 TABLET ORAL at 14:58

## 2018-04-18 RX ADMIN — PROPOFOL 150 MG: 10 INJECTION, EMULSION INTRAVENOUS at 08:05

## 2018-04-18 RX ADMIN — LABETALOL HYDROCHLORIDE 10 MG: 5 INJECTION INTRAVENOUS at 10:01

## 2018-04-18 RX ADMIN — LABETALOL HYDROCHLORIDE 20 MG: 5 INJECTION INTRAVENOUS at 10:21

## 2018-04-18 RX ADMIN — Medication 20 MG: at 19:22

## 2018-04-18 RX ADMIN — NITROGLYCERIN 50 MCG: 10 INJECTION INTRAVENOUS at 09:03

## 2018-04-18 RX ADMIN — MIDAZOLAM 1 MG: 1 INJECTION INTRAMUSCULAR; INTRAVENOUS at 08:21

## 2018-04-18 RX ADMIN — Medication 20 MG: at 20:35

## 2018-04-18 RX ADMIN — ESMOLOL HYDROCHLORIDE 50 MG: 10 INJECTION, SOLUTION INTRAVENOUS at 08:51

## 2018-04-18 RX ADMIN — Medication 10 MG: at 20:18

## 2018-04-18 RX ADMIN — DEXMEDETOMIDINE HYDROCHLORIDE 0.5 MCG/KG/HR: 100 INJECTION, SOLUTION INTRAVENOUS at 07:56

## 2018-04-18 RX ADMIN — Medication 20 MG: at 22:54

## 2018-04-18 RX ADMIN — Medication 5 MG: at 12:28

## 2018-04-18 RX ADMIN — SODIUM CHLORIDE: 9 INJECTION, SOLUTION INTRAVENOUS at 11:27

## 2018-04-18 RX ADMIN — CLEVIDIPINE 0.25 MG: 0.5 EMULSION INTRAVENOUS at 09:38

## 2018-04-18 RX ADMIN — HYDRALAZINE HYDROCHLORIDE 5 MG: 20 INJECTION INTRAMUSCULAR; INTRAVENOUS at 16:00

## 2018-04-18 RX ADMIN — Medication 10 MG: at 10:58

## 2018-04-18 RX ADMIN — Medication 10 MG: at 14:40

## 2018-04-18 RX ADMIN — CEFAZOLIN 2 G: 1 INJECTION, POWDER, FOR SOLUTION INTRAMUSCULAR; INTRAVENOUS at 08:14

## 2018-04-18 RX ADMIN — NITROGLYCERIN 100 MCG: 10 INJECTION INTRAVENOUS at 09:08

## 2018-04-18 RX ADMIN — NITROGLYCERIN 50 MCG: 10 INJECTION INTRAVENOUS at 09:04

## 2018-04-18 RX ADMIN — LEVETIRACETAM 750 MG: 750 TABLET, FILM COATED ORAL at 20:13

## 2018-04-18 RX ADMIN — Medication 5 MG: at 11:39

## 2018-04-18 RX ADMIN — CLEVIDIPINE 0.25 MG: 0.5 EMULSION INTRAVENOUS at 09:11

## 2018-04-18 RX ADMIN — Medication 40 MCG: at 07:46

## 2018-04-18 RX ADMIN — CYCLOSPORINE 50 MG: 25 CAPSULE, LIQUID FILLED ORAL at 20:06

## 2018-04-18 RX ADMIN — Medication 10 MG: at 20:05

## 2018-04-18 RX ADMIN — HYDRALAZINE HYDROCHLORIDE 10 MG: 20 INJECTION INTRAMUSCULAR; INTRAVENOUS at 23:51

## 2018-04-18 RX ADMIN — HYDRALAZINE HYDROCHLORIDE 10 MG: 20 INJECTION INTRAMUSCULAR; INTRAVENOUS at 15:18

## 2018-04-18 RX ADMIN — Medication 10 MG: at 14:57

## 2018-04-18 RX ADMIN — LABETALOL HYDROCHLORIDE 10 MG: 5 INJECTION INTRAVENOUS at 09:58

## 2018-04-18 RX ADMIN — LEVETIRACETAM 1 G: 10 INJECTION INTRAVENOUS at 10:24

## 2018-04-18 RX ADMIN — MIDAZOLAM 0.5 MG: 1 INJECTION INTRAMUSCULAR; INTRAVENOUS at 09:46

## 2018-04-18 ASSESSMENT — VISUAL ACUITY
OU: NORMAL ACUITY

## 2018-04-18 NOTE — ANESTHESIA POSTPROCEDURE EVALUATION
Patient: Sarabjit Calvin    Procedure(s):  Stealth Guided Left Craniotomy for Tumor Resection (Awake) - Wound Class: I-Clean    Diagnosis:Tumor   Diagnosis Additional Information: No value filed.    Anesthesia Type:  MAC    Note:  Anesthesia Post Evaluation    Patient location during evaluation: PACU  Patient participation: Able to fully participate in evaluation  Level of consciousness: awake and alert  Pain management: adequate  Airway patency: patent  Cardiovascular status: acceptable  Respiratory status: acceptable  Hydration status: acceptable  PONV: none     Anesthetic complications: None    Comments: Some word finding difficulty; NSG aware.          Last vitals:  Vitals:    04/18/18 1145 04/18/18 1200 04/18/18 1215   BP: 137/73 138/65 124/65   Pulse:      Resp: 16 16 16   Temp: 36.8  C (98.2  F)     SpO2: 98% 96% 97%         Electronically Signed By: Batool Manzano MD  April 18, 2018  12:28 PM

## 2018-04-18 NOTE — IP AVS SNAPSHOT
"    [address] [facility] BANK: [phone]                                              INTERAGENCY TRANSFER FORM - LAB / IMAGING / EKG / EMG RESULTS   2018                    Hospital Admission Date: 2018  KAVITHA SHAFFER   : 1955  Sex: Male        Attending Provider: Miky Camacho MD     Allergies:  No Known Allergies    Infection:  None   Service:  NEUROSURGERY    Ht:  1.778 m (5' 10\")   Wt:  70.1 kg (154 lb 8.7 oz)   Admission Wt:  71.7 kg (158 lb 1.1 oz)    BMI:  22.17 kg/m 2   BSA:  1.86 m 2            Patient PCP Information     Provider PCP Type    Provider Not In System General         Lab Results - 3 Days      Surgical pathology exam [[id number]]  Resulted: 18 1450, Result status: Edited Result - FINAL    Ordering provider: Miky Camacho MD  18 Resulting lab: [facility]    Specimen Information    Type Source Collected On   Tissue Brain 18   Comment:  Walked to pathology   Tissue Brain 18   Comment:  Walked to pathology by [employer] staff          Components       Value Reference Range Flag Lab   Copath Report --      Result:         Patient Name: KAVITHA SHAFFER  MR#: [id number]  Specimen #: [id number]  Collected: 2018  Received: 2018  Reported: 2018 14:39  Ordering Phy(s): MIKY CAMACHO    For improved result formatting, select 'View Enhanced Report Format' under   Linked Documents section.    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    I have personally reviewed all specimens and/or slides, including the   listed special stains, and used them  with my medical judgement to determine or confirm the final di agnosis.    Electronically signed out by:    CRISELDA Bolaños M.D., JULIÁNPhysicifara    GROSS:  A:  The specimen is received fresh for frozen section, with proper patient   identification, labeled \"left  frontal brain tumor\".  It consists of two segments of tan-red soft tissue   ranging from 0.7-0.8 cm in greatest  dimension.  The specimen is submitted entirely for frozen section.  The   remainder of the frozen section block  is wrapped and submitted as A1 FS.    B:  The specimen is received fresh with proper patient identification,   labeled \"left frontal brain tumor\".  The specimen consists of a 6.9 g, 3.6 x 2.7 x 1.6 cm segment of pink-red   masslike tissue.  No normal brain  parenchyma is grossly identified.  Rep. sections of the tissue is   submitted in cassettes B1-B5. (Dictated by:  Radha GREGORY [facility] 4/18/2018 12:39 PM)    INTRAOPERATIVE CONSULTATION:  Frozen section is performed on part A. Please refer to Epic Result History   for the Preliminary Intraoperative  Diagnosis.    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    The cells have a more astrocytic  morphology, but there are areas with a somewhat lobular vascular network.    Vascular proliferation is  prominent, as is extensive necrosis.  Mitoses are present and there is   focally severe nuclear pleomorphism.  Immunohistochemical stains were performed for the protein-product of the   [id number]-qapbvzdl in IDH1 and for ATRX  with appropriate controls.  There is no immunoreactivity for the mutant   form of IHD1 in the neoplastic cells  and the neoplastic cells retain immunoreactivity for ATRX.  Permanent   sections confirm the intraoperative  diagnosis on AFS1.    CPT Codes:  A: 43060-EK7, 33975-TK  B: 23789-MK3, SOH, SOH, " "06333-DVYMZ.T, 38152-CKE.P    TESTING LAB LOCATION:  [facility], [address]   [address] [phone]    COLLECTION SITE:  Client: [facility]  Location: [facility] (B)                MGMT Promoter Methylation Tumor [[id number]]  Resulted: 04/23/18 1125, Result status: In process    Ordering provider: Miky Garcia MD  04/23/18 1122 Resulting lab: [facility]    Specimen Information    Type Source Collected On   Fixed Tissue  04/18/18 0957            Calcium ionized [[id number]] (Abnormal)  Resulted: 04/23/18 1027, Result status: Final result    Ordering provider: Tammy Osorio APRN CNP  04/23/18 0845 Resulting lab: [facility]    Specimen Information    Type Source Collected On   Blood  04/23/18 0947          Components       Value Reference Range Flag Lab   Calcium Ionized 4.2 4.4 - 5.2 mg/dL L 51            Testing Performed By     Lab - Abbreviation Name Director Address Valid Date Range    51 - Unknown [facility] Unknown [address] [facility] [address] 12/31/14 1010 - Present    88 - Unknown [facility] Unknown Unknown 10/30/02 0000 - Present            Unresulted Labs (24h ago through future)    Start       Ordered    04/23/18 1915  Methicillin Resistant Staph Aureus PCR  ROUTINE,   Routine     Question:  If MRSA positive, should susceptibilities be performed?  Answer:  No    04/23/18 1904 04/23/18 1915  Vancomycin resistant enter culture  ROUTINE,   Routine      04/23/18 1904    Unscheduled  Potassium  (Potassium Replacement - \"High\" - Replacement for all levels less than 4.1 mmol/L - UU,UR,UA,RH,SH,PH,WY )  CONDITIONAL (SPECIFY),   Routine     Comments:  Obtain Potassium Level for these conditions:  *IF no potassium result within 24 hrs before initiation of order set, draw potassium level with next lab " "collect.    *2 HOURS AFTER last IV potassium replacement dose and 4 hours after an oral replacement dose when potassium replacement given for level less than 3.4.  *Next morning after potassium dose.     Repeat Potassium Replacement if necessary.    04/18/18 1638    Unscheduled  Magnesium  (Magnesium Replacement - Adult - \"High\" - Replacement for all levels less than or equal to 2 mg/dL)  CONDITIONAL (SPECIFY),   Routine     Comments:  Obtain Magnesium Level for these conditions:  *IF no magnesium result within 24 hrs before initiation of order set, draw magnesium level with next lab collect.    *2 HOURS AFTER last magnesium replacement dose when magnesium replacement given for level less than 1.6  *Next morning after magnesium dose.     Repeat Magnesium Replacement if necessary.    04/18/18 1638    Unscheduled  Phosphorus  (POTASSIUM Phosphate - \"High\" - Replacement for all levels less than 2.8 mg/dL )  CONDITIONAL (SPECIFY),   Routine     Comments:  Obtain Phosphorus Level for these conditions:  *IF no phosphorus result within 24 hrs before initiation of order set, draw phosphorus level with next lab collect.    *2 HOURS AFTER last phosphorus replacement dose when phosphorus replacement given for level less than 2.0  *Next morning after phosphorus dose.     Repeat Phosphorus Replacement if necessary.    04/18/18 1638      Encounter-Level Documents:     There are no encounter-level documents.      Order-Level Documents:     There are no order-level documents.      "

## 2018-04-18 NOTE — OR NURSING
Pt is to be NPO until he has a speech eval per Dr. Guzman. I informed Dr Guzman that I had given the pt ice chips and sips of water without any demonstration of difficulty. I then told the pt there would be no more oral intake. Pt had swallowed his metoprolol earlier with water and demonstrated no coughing, or sputtering, or choking.

## 2018-04-18 NOTE — PLAN OF CARE
Problem: Patient Care Overview  Goal: Plan of Care/Patient Progress Review  Pt arrived from PACU at 1630 from L crani.  Neuro: Follows commands. Expressive aphasia and trouble wordfinding. Pupils equal and reactive. R facial droop. Weaker dorsiflex on R foot, nonexistent to weak R  but moves arms equally on both sides. MD Aware and surgeon at bedside.  Respiratory: On room air, LS clear.   GI/: NPO but can have ice chips, MD verified ok to have ice chips/meds despite facial droop. Swallow test cleared and no coughing or delay in swallowing. Driver in place, UO dropping slightly.   Vitals: Tmax 98.9. Sinus tach up to 105. BP hypertensive. Denies pain.  Continue to monitor, notify MD with any concerns.

## 2018-04-18 NOTE — IP AVS SNAPSHOT
` `     [address] [facility] BANK: [phone]            Medication Administration Report for Sarabjit Calvin as of 04/24/18 0644   Legend:    Given Hold Not Given Due Canceled Entry Other Actions    Time Time (Time) Time  Time-Action       Inactive    Active    Linked        Medications 04/18/18 04/19/18 04/20/18 04/21/18 04/22/18 04/23/18 04/24/18    acetaminophen (TYLENOL) tablet 650 mg  Dose: 650 mg  Freq: EVERY 4 HOURS PRN Route: PO  PRN Reason: other  PRN Comment: multimodal surgical pain management along with NSAIDS and opioid medication as indicated based on pain control and physical function.  Start: 04/21/18 0000   Admin Instructions: May give first dose 4 hours after last scheduled dose of acetaminophen  Maximum acetaminophen dose from all sources = 75 mg/kg/day not to exceed 4 grams/day.    Admin. Amount: 2 tablet (2 × 325 mg tablet)  Dispense Loc: [facility] ADS 6A               atorvastatin (LIPITOR) tablet 10 mg  Dose: 10 mg  Freq: EVERY MORNING Route: PO  Start: 04/19/18 0800   Admin. Amount: 1 tablet (1 × 10 mg tablet)  Last Admin: 04/23/18 0803  Dispense Loc: [facility] ADS 6A      (0737)-Not Given        0844 (10 mg)-Given        0854 (10 mg)-Given        0756 (10 mg)-Given        0803 (10 mg)-Given        [ ] 0800           bumetanide (BUMEX) tablet 1 mg  Dose: 1 mg  Freq: 2 TIMES DAILY Route: PO  Start: 04/19/18 0800   Admin. Amount: 1 tablet (1 × 1 mg tablet)  Last Admin: 04/23/18 2004  Dispense Loc: [facility]S 6A      (0737)-Not Given       2015 (1 mg)-Given        0844 (1 mg)-Given       2004 (1 mg)-Given        0854 (1 mg)-Given       2021 (1 mg)-Given        0756 (1 mg)-Given       1927 (1 mg)-Given        0802 (1 mg)-Given       2004 (1 mg)-Given        [ ] 0800       [ ] 2000           cycloSPORINE (sandIMMUNE) capsule 50 mg  Dose: 50 mg  Freq: 2 TIMES DAILY. Route: PO  Start: 04/18/18 1915   Admin. Amount: 2 capsule (2 × 25 mg capsule)  Last Admin: 04/23/18 5045  Dispense Loc: [facility]     2006 (50 mg)-Given        (0736)-Not Given [C]       2015 (50 mg)-Given        0844 (50 mg)-Given       1747 (50 mg)-Given        0855 (50 mg)-Given       1756 (50 mg)-Given        0756 (50 mg)-Given       1815 (50 mg)-Given        0803 (50 mg)-Given       1751 (50 mg)-Given        [ ] 0800       [ ] 1800           dexamethasone (DECADRON) tablet 4 mg  Dose: 4 mg  Freq: EVERY 8 HOURS SCHEDULED Route: PO  Start: 04/24/18 0600   Admin. Amount: 1 tablet (1 × 4 mg tablet)  Last Admin: 04/24/18 0611  Dispense Loc: [facility] ADS 6A           0611 (4 mg)-Given       [ ] 1400       [ ] 2200           hydrALAZINE (APRESOLINE) injection 10-20 mg  Dose: 10-20 mg  Freq: EVERY 30 MIN PRN Route: IV  PRN Reason: high blood pressure  Start: 04/20/18 1440   Admin Instructions: IF Heart Rate less than 60 initiate hydrALAZINE (APRESOLINE) for hypertension. For Systolic Blood Pressure greater than 160 mmHg. Give 10 mg, wait 30 minutes. If not effective then repeat 10 mg. Wait 30 minutes. If not effective then give 20 mg. If still not effective then start niCARdipine (CARDENE) IV infusion IF ORDERED. Notify provider within 1 hour if Blood Pressure parameters are not met.  For ordered doses up to 40 mg, give IV Push undiluted over 1 minute.    Admin. Amount: 10-20 mg = 0.5-1 mL Conc: 20 mg/mL  Dispense Loc: [facility]S 6A  Infused Over: 1 Minutes  Volume: 1 mL               labetalol (NORMODYNE/TRANDATE) injection 10-40 mg  Dose: 10-40 mg  Freq: EVERY 10 MIN PRN Route: IV  PRN Reason: high blood pressure  Start: 04/20/18 1440   Admin Instructions: IF Heart Rate 60 beats per minute or greater initiate labetalol (NORMODYNE,TRANDATE) for hypertension. For Systolic Blood Pressure greater than 160 mmHg. Hold if Heart Rate less than 60 beats per minute. Give dose over 1-2 minutes. Increase or repeat the dose if Blood Pressure goal not met. Give 10 mg, wait 10 minutes.  If not effective then give 20 mg. Wait 10 minutes.  If not  "effective then give 40 mg. If still not effective then start niCARdipine (CARDENE) IV infusion IF ORDERED. Notify provider within 1 hour if Blood Pressure parameters are not met.  For ordered doses up to 80 mg, give IV Push undiluted. Give each 20 mg over 2 minutes.    Admin. Amount: 10-40 mg = 2-8 mL Conc: 5 mg/mL  Dispense Loc: [facility]  Infused Over: 2-8 Minutes  Volume: 8 mL               levETIRAcetam (KEPPRA) tablet 750 mg  Dose: 750 mg  Freq: 2 TIMES DAILY Route: PO  Start: 04/18/18 2000   Admin. Amount: 1 tablet (1 × 750 mg tablet)  Last Admin: 04/24/18 0611  Dispense Loc: [facility] 6A     2013 (750 mg)-Given                       0844 (750 mg)-Given       2004 (750 mg)-Given        0855 (750 mg)-Given       2020 (750 mg)-Given        0756 (750 mg)-Given       1927 (750 mg)-Given        0802 (750 mg)-Given       2006 (750 mg)-Given        0611 (750 mg)-Given       [ ] 0800       [ ] 2000           lidocaine (LMX4) kit  Freq: EVERY 1 HOUR PRN Route: Top  PRN Reason: pain  PRN Comment: with VAD insertion or accessing implanted port.  Start: 04/18/18 1638   Admin Instructions: Do NOT give if patient has a history of allergy to any local anesthetic or any \"yaya\" product.   Apply 30 minutes prior to VAD insertion or port access.  MAX Dose:  2.5 g (  of 5 g tube)    Dispense Loc: [facility] 6A               lidocaine 1 % 1 mL  Dose: 1 mL  Freq: EVERY 1 HOUR PRN Route: OTHER  PRN Comment: mild pain with VAD insertion or accessing implanted port  Start: 04/18/18 1638   Admin Instructions: Do NOT give if patient has a history of allergy to any local anesthetic or any \"yaya\" product. MAX dose 1 mL subcutaneous OR intradermal in divided doses.    Admin. Amount: 1 mL  Dispense Loc: [facility]S 6A  Volume: 2 mL               magnesium sulfate 2 g in NS intermittent infusion ([employer] or [facility])  Dose: 2 g  Freq: DAILY PRN Route: IV  PRN Reason: magnesium supplementation  Start: 04/18/18 1638   Admin " Instructions: For Serum Mg++ 1.6 - 2 mg/dL  Give 2 g and recheck magnesium level next AM.    Admin. Amount: 2 g = 50 mL Conc: 2 g/50 mL  Dispense Loc: [facility]S 6A  Infused Over: 60 Minutes  Volume: 50 mL               magnesium sulfate 4 g in 100 mL sterile water (premade)  Dose: 4 g  Freq: EVERY 4 HOURS PRN Route: IV  PRN Reason: magnesium supplementation  Start: 04/18/18 1638   Admin Instructions: For serum Mg++ less than 1.6 mg/dL  Give 4 g and recheck magnesium level 2 hours after dose, and next AM.    Admin. Amount: 4 g = 100 mL Conc: 4 g/100 mL  Dispense Loc: [facility]  Infused Over: 120 Minutes  Volume: 100 mL               melatonin tablet 1 mg  Dose: 1 mg  Freq: AT BEDTIME PRN Route: PO  PRN Reason: sleep  Start: 04/23/18 2201   Admin. Amount: 1 tablet (1 × 1 mg tablet)  Last Admin: 04/23/18 2344  Dispense Loc: [facility] 6[facility]          2344 (1 mg)-Given            metoprolol tartrate (LOPRESSOR) tablet 25 mg  Dose: 25 mg  Freq: 3 TIMES DAILY Route: PO  Start: 04/20/18 1415   Admin. Amount: 1 tablet (1 × 25 mg tablet)  Last Admin: 04/24/18 0611  Dispense Loc: [facility]S 6A       1507 (25 mg)-Given       2004 (25 mg)-Given        0855 (25 mg)-Given       1413 (25 mg)-Given       2021 (25 mg)-Given        0757 (25 mg)-Given       1419 (25 mg)-Given       1931 (25 mg)-Given        0803 (25 mg)-Given       1333 (25 mg)-Given       2005 (25 mg)-Given        0611 (25 mg)-Given       [ ] 0800       [ ] 1400       [ ] 2000           ondansetron (ZOFRAN-ODT) ODT tab 4-8 mg  Dose: 4-8 mg  Freq: EVERY 6 HOURS PRN Route: PO  PRN Reasons: nausea,vomiting  Start: 04/18/18 1638   Admin Instructions: This is Step 1 of nausea and vomiting management.  If nausea not resolved in 15 minutes, go to Step 2 prochlorperazine (COMPAZINE). Do not push through foil backing. Peel back foil and gently remove. Place on tongue immediately. Administration with liquid unnecessary  With dry hands, peel back foil backing and  gently remove tablet; do not push oral disintegrating tablet through foil backing; administer immediately on tongue and oral disintegrating tablet dissolves in seconds; then swallow with saliva; liquid not required.    Admin. Amount: 1-2 tablet (1-2 × 4 mg tablet)  Dispense Loc: [facility] ADS 6A                     Or  ondansetron (ZOFRAN) injection 4-8 mg  Dose: 4-8 mg  Freq: EVERY 6 HOURS PRN Route: IV  PRN Reasons: nausea,vomiting  Start: 04/18/18 1638   Admin Instructions: This is Step 1 of nausea and vomiting management.  If nausea not resolved in 15 minutes, go to Step 2 prochlorperazine (COMPAZINE).  Irritant. For ordered doses up to 4 mg, give IV Push undiluted over 2-5 minutes.    Admin. Amount: 4-8 mg = 2-4 mL Conc: 4 mg/2 mL  Last Admin: 04/19/18 0419  Dispense Loc: [facility]S 6A  Infused Over: 2-5 Minutes  Volume: 4 mL      0419 (4 mg)-Given                pantoprazole (PROTONIX) EC tablet 40 mg  Dose: 40 mg  Freq: EVERY MORNING Route: PO  Start: 04/19/18 0800   Admin Instructions: DO NOT CRUSH.    Admin. Amount: 1 tablet (1 × 40 mg tablet)  Last Admin: 04/24/18 0611  Dispense Loc: [facility]S 6A              0844 (40 mg)-Given        0855 (40 mg)-Given        0757 (40 mg)-Given        0803 (40 mg)-Given        0611 (40 mg)-Given       [ ] 0800           polyethylene glycol (MIRALAX/GLYCOLAX) Packet 17 g  Dose: 17 g  Freq: 3 TIMES DAILY Route: PO  Start: 04/18/18 2000   Admin Instructions: 1 Packet = 17 grams. Mixed prescribed dose in 8 ounces of water. Follow with 8 oz. of water.    Admin. Amount: 17 g  Last Admin: 04/20/18 0844  Dispense Loc: [facility]S 6A     (2121)-Not Given        (0708)-Not Given       (1403)-Not Given       (2201)-Not Given [C]        0844 (17 g)-Given       (1448)-Not Given [C]       (2005)-Not Given [C]        (0856)-Not Given              (2021)-Not Given [C]        (0757)-Not Given       (1420)-Not Given       (1932)-Not Given        (0803)-Not Given       (1333)-Not Given        (2007)-Not Given        [ ] 0800       [ ] 1400       [ ] 2000           potassium chloride (KLOR-CON) Packet 20-40 mEq  Dose: 20-40 mEq  Freq: EVERY 2 HOURS PRN Route: ORAL OR FEED  PRN Reason: potassium supplementation  Start: 04/18/18 1638   Admin Instructions: Use if unable to tolerate tablets.    If Serum K+ 3.4-4.0, dose = 20 mEq x1. Recheck K+ level the next AM.  If Serum K+ 3.0-3.3, dose = 60 mEq po total dose (40 mEq x 1 followed in 2 hours by 20 mEq X1). Recheck K+ level 4 hours after dose and the next AM.  If Serum K+ 2.5-2.9, dose = 80 mEq po total dose (40 mEq Q2H x2). Recheck K+ level 4 hours after dose and the next AM.  If Serum K+ less than 2.5, See IV order.  Dissolve packet contents in 4-8 ounces of cold water or juice.    Admin. Amount: 20-40 mEq  Dispense Loc: [facility] ADS 6A               potassium chloride 10 mEq in 100 mL intermittent infusion with 10 mg lidocaine  Dose: 10 mEq  Freq: EVERY 1 HOUR PRN Route: IV  PRN Reason: potassium supplementation  Start: 04/18/18 1638   Admin Instructions: Infuse via PERIPHERAL LINE. Use potassium with lidocaine for pain with peripheral administration.  If Serum K+ 3.4-4.0, dose = 10 mEq/hr x2 doses. Recheck K+ level the next AM.  If Serum K+ 3.0-3.3, dose = 10 mEq/hr x4 doses (40 mEq IV total dose). Recheck K+ level 2 hours after dose and the next AM.  If Serum K+ less than 3.0, dose = 10 mEq/hr x6 doses (60 mEq IV total dose). Recheck K+ level 2 hours after dose and the next AM.    Admin. Amount: 10 mEq = 100 mL Conc: 10 mEq/100 mL  Last Admin: 04/19/18 1715  Dispense Loc: [facility] ADS 6A  Infused Over: 1 Hours  Volume: 100 mL      1408 (10 mEq)-New Bag       1715 (10 mEq)-New Bag                potassium chloride 10 mEq in 100 mL sterile water intermittent infusion (premix)  Dose: 10 mEq  Freq: EVERY 1 HOUR PRN Route: IV  PRN Reason: potassium supplementation  Start: 04/18/18 1638   Admin Instructions: Infuse via PERIPHERAL LINE or CENTRAL LINE. Use for  central line replacement if patient weight less than 65 kg, if patient is on TPN with high potassium content or if unit does not stock 20 mEq bags.  If Serum K+ 3.4-4.0, dose = 10 mEq/hr x2 doses. Recheck K+ level the next AM.  If Serum K+ 3.0-3.3, dose = 10 mEq/hr x4 doses (40 mEq IV total dose). Recheck K+ level 2 hours after dose and the next AM.  If Serum K+ less than 3.0, dose = 10 mEq/hr x6 doses (60 mEq IV total dose). Recheck K+ level 2 hours after dose and the next AM.    Admin. Amount: 10 mEq = 100 mL Conc: 10 mEq/100 mL  Dispense Loc: [facility]  Infused Over: 60 Minutes  Volume: 100 mL               potassium chloride 20 mEq in 50 mL intermittent infusion  Dose: 20 mEq  Freq: EVERY 1 HOUR PRN Route: IV  PRN Reason: potassium supplementation  Start: 04/18/18 1638   Admin Instructions: Infuse via CENTRAL LINE Only.  May need EKG if less than 65 kg or on TPN - Max rate is 0.3 mEq/kg/hr for patients not on EKG monitoring.    If Serum K+ 3.4-4.0, dose = 20 mEq/hr x1 doses. Recheck K+ level the next AM.  If Serum K+ 3.0-3.3, dose = 20 mEq/hr x2 doses (40 mEq IV total dose).  Recheck K+ level 2 hours after dose and the next AM.  If Serum K+ less than 3.0, dose = 20 mEq/hr x3 doses (60 mEq IV total dose). Recheck K+ level 2 hours after dose and the next AM.    Admin. Amount: 20 mEq = 50 mL Conc: 20 mEq/50 mL  Dispense Loc: [facility]  Volume: 50 mL               potassium chloride SA (K-DUR/KLOR-CON M) CR tablet 20-40 mEq  Dose: 20-40 mEq  Freq: EVERY 2 HOURS PRN Route: PO  PRN Reason: potassium supplementation  Start: 04/18/18 1638   Admin Instructions: Use if able to take PO.   If Serum K+ 3.4-4.0, dose = 20 mEq x1. Recheck K+ level the next AM.  If Serum K+ 3.0-3.3, dose = 60 mEq po total dose (40 mEq x1 followed in 2 hours by 20 mEq x1). Recheck K+ level 4 hours after dose and the next AM.  If Serum K+ 2.5-2.9, dose = 80 mEq po total dose (40 mEq Q2H x2). Recheck K+ level 4 hours after  dose and the next AM.  If Serum K+ less than 2.5, See IV order.  DO NOT CRUSH.    Admin. Amount: 2-4 tablet (2-4 × 10 mEq tablet)  Dispense Loc: [facility]S 6A               potassium phosphate 10 mmol in D5W 250 mL intermittent infusion  Dose: 10 mmol  Freq: DAILY PRN Route: IV  PRN Reason: phosphorous supplementation  Start: 04/18/18 1638   Admin Instructions: For serum phosphorus level 2.5-2.7  Do not infuse Phosphorus in the same line as TPN.   Give 10 mmol and recheck phosphorus level the next AM.  Each mmol of phosphate provides 1.47 mEq of Potassium. Multiply the patient's phosphate dose by 1.47 to determine the amount of potassium in this dose.    Admin. Amount: 10 mmol  Dispense Loc: [facility]  Infused Over: 4 Hours  Volume: 250 mL   Mixture Administration Information:   Medication Type Amount   potassium phosphate 3 MMOLE/ML SOLN Medications 10 mmol   D5W 5 % SOLN Base 250 mL                       potassium phosphate 15 mmol in D5W 250 mL intermittent infusion  Dose: 15 mmol  Freq: DAILY PRN Route: IV  PRN Reason: phosphorous supplementation  Start: 04/18/18 1638   Admin Instructions: For serum phosphorus level 2.0-2.4  Do not infuse Phosphorus in the same line as TPN.   Give 15 mmol and recheck phosphorus level next AM.  Each mmol of phosphate provides 1.47 mEq of Potassium. Multiply the patient's phosphate dose by 1.47 to determine the amount of potassium in this dose.    Admin. Amount: 15 mmol  Last Admin: 04/19/18 0954  Dispense Loc: [facility]  Infused Over: 4 Hours  Volume: 250 mL   Mixture Administration Information:   Medication Type Amount   potassium phosphate 3 MMOLE/ML SOLN Medications 15 mmol   D5W 5 % SOLN Base 250 mL              0954 (15 mmol)-New Bag                potassium phosphate 20 mmol in D5W 250 mL intermittent infusion  Dose: 20 mmol  Freq: EVERY 6 HOURS PRN Route: IV  PRN Reason: phosphorous supplementation  Start: 04/18/18 1638   Admin Instructions: For  serum phosphorus level 1.1-1.9  For CENTRAL Line ONLY  Do not infuse Phosphorus in the same line as TPN.   Give 20 mmol and recheck phosphorus level 2 hours after dose and next AM.  Each mmol of phosphate provides 1.47 mEq of Potassium. Multiply the patient's phosphate dose by 1.47 to determine the amount of potassium in this dose.    Admin. Amount: 20 mmol  Dispense Loc: [facility]  Infused Over: 4 Hours  Volume: 250 mL   Mixture Administration Information:   Medication Type Amount   potassium phosphate 3 MMOLE/ML SOLN Medications 20 mmol   D5W 5 % SOLN Base 250 mL                       potassium phosphate 20 mmol in D5W 500 mL intermittent infusion  Dose: 20 mmol  Freq: EVERY 6 HOURS PRN Route: IV  PRN Reason: phosphorous supplementation  Start: 04/18/18 1638   Admin Instructions: For serum phosphorus level 1.1-1.9  For peripheral line  Do not infuse Phosphorus in the same line as TPN.   Give 20 mmol and recheck phosphorus level 2 hours after dose and next AM. Repeat if necessary.  Each mmol of phosphate provides 1.47 mEq of Potassium. Multiply the patient's phosphate dose by 1.47 to determine the amount of potassium in this dose.    Admin. Amount: 20 mmol  Dispense Loc: [facility]  Infused Over: 4 Hours  Volume: 500 mL   Mixture Administration Information:   Medication Type Amount   potassium phosphate 3 MMOLE/ML SOLN Medications 20 mmol   D5W 5 % SOLN Base 500 mL                       potassium phosphate 25 mmol in D5W 500 mL intermittent infusion  Dose: 25 mmol  Freq: EVERY 8 HOURS PRN Route: IV  PRN Reason: phosphorous supplementation  Start: 04/18/18 1638   Admin Instructions: For serum phosphorus level less than 1.1  Do not infuse Phosphorus in the same line as TPN.   Give 25 mmol and recheck phosphorus level 2 hours after dose and next AM.  Each mmol of phosphate provides 1.47 mEq of Potassium. Multiply the patient's phosphate dose by 1.47 to determine the amount of potassium in this  dose.    Admin. Amount: 25 mmol  Dispense Loc: [facility]  Infused Over: 6 Hours  Volume: 500 mL   Mixture Administration Information:   Medication Type Amount   potassium phosphate 3 MMOLE/ML SOLN Medications 25 mmol   D5W 5 % SOLN Base 500 mL                       prochlorperazine (COMPAZINE) injection 10 mg  Dose: 10 mg  Freq: EVERY 6 HOURS PRN Route: IV  PRN Reasons: nausea,vomiting  Start: 04/18/18 1638   Admin Instructions: This is Step 2 of nausea and vomiting management.   If nausea not resolved in 15 minutes, give metoclopramide (REGLAN), if ordered (step 3 of nausea and vomiting management)  For ordered doses up to 10 mg, give IV Push undiluted. Each 5mg over 1 minute.    Admin. Amount: 10 mg = 2 mL Conc: 5 mg/mL  Dispense Loc: [facility] ADS 6A  Infused Over: 1-2 Minutes  Volume: 2 mL              Or  prochlorperazine (COMPAZINE) tablet 10 mg  Dose: 10 mg  Freq: EVERY 6 HOURS PRN Route: PO  PRN Reasons: nausea,vomiting  Start: 04/18/18 1638   Admin Instructions: This is Step 2 of nausea and vomiting management.   If nausea not resolved in 15 minutes, give metoclopramide (REGLAN), if ordered (step 3 of nausea and vomiting management)    Admin. Amount: 1 tablet (1 × 10 mg tablet)  Dispense Loc: [facility]               senna-docusate (SENOKOT-S;PERICOLACE) 8.6-50 MG per tablet 3 tablet  Dose: 3 tablet  Freq: 2 TIMES DAILY Route: PO  Start: 04/18/18 2000   Admin. Amount: 3 tablet  Last Admin: 04/23/18 2005  Dispense Loc: [facility]     2005 (3 tablet)-Given        (0709)-Not Given       2015 (3 tablet)-Given        0843 (1 tablet)-Given       (2005)-Not Given [C]        0855 (3 tablet)-Given       (2022)-Not Given [C]        (0756)-Not Given       (1932)-Not Given        0802 (3 tablet)-Given       2005 (1 tablet)-Given [C]        [ ] 0800       [ ] 2000           sodium chloride (PF) 0.9% PF flush 3 mL  Dose: 3 mL  Freq: EVERY 1 HOUR PRN Route: IK  PRN Reason: line flush  PRN Comment: for  peripheral IV flush post IV meds  Start: 18 1638   Admin. Amount: 3 mL  Dispense Loc: [facility] Floor Stock  Volume: 3 mL              Completed Medications  Medications 18         Dose: 1 g  Freq: ONCE Route: IV  Start: 18 1115   End: 18 1433   Admin. Amount: 1 g  Last Admin: 18 1333  Dispense Loc: [facility]  Infused Over: 60 Minutes  Administrations Remainin  Volume: 100 mL   Mixture Administration Information:   Medication Type Amount   calcium gluconate 10 % SOLN Medications 1 g   D5W 5 % SOLN Base 100 mL                  1333 (1 g)-New Bag              Dose: 4 mg  Freq: EVERY 8 HOURS SCHEDULED Route: PO  Start: 18 0600   End: 18   Admin. Amount: 1 tablet (1 × 4 mg tablet)  Last Admin: 18  Dispense Loc: [facility]  Administrations Remainin        0627 (4 mg)-Given       1414 (4 mg)-Given       2310 (4 mg)-Given        0755 (4 mg)-Given       1549 (4 mg)-Given       2139 (4 mg)-Given            Discontinued Medications  Medications 18         Dose: 975 mg  Freq: EVERY 8 HOURS Route: PO  Start: 18 1645   End: 18 1659   Admin Instructions: Do not use if patient has an active opioid/acetaminophen combined analgesic product ordered for pain.  Maximum acetaminophen dose from all sources = 75 mg/kg/day not to exceed 4 grams/day.    Admin. Amount: 3 tablet (3 × 325 mg tablet)  Last Admin: 18 0854  Dispense Loc: [facility]  Administrations Remainin     (1700)-Not Given        (0113)-Not Given [C]       (0736)-Not Given       (1715)-Not Given       2343 (975 mg)-Given        0844 (975 mg)-Given       1738 (975 mg)-Given        0058 (975 mg)-Given       0854 (975 mg)-Given       1659-Med Discontinued            Dose: 2 mg  Freq: EVERY 8 HOURS SCHEDULED Route: PO  Start: 18 0600   End: 18 8323    Admin. Amount: 2 tablet (2 × 1 mg tablet)  Last Admin: 18  Dispense Loc: [facility]  Administrations Remainin          0654 (2 mg)-Given       1333 (2 mg)-Given       2109 (1 mg)-Given [C]       2115 (1 mg)-Given       -Med Discontinued       Followed by    Dose: 1 mg  Freq: EVERY 8 HOURS SCHEDULED Route: PO  Start: 18 0600   End: 18   Admin. Amount: 1 tablet (1 × 1 mg tablet)  Dispense Loc: [facility]  Administrations Remainin          -Med Discontinued       Followed by    Dose: 1 mg  Freq: EVERY 12 HOURS SCHEDULED Route: PO  Start: 18 0800   End: 18   Admin. Amount: 1 tablet (1 × 1 mg tablet)  Dispense Loc: [facility]  Administrations Remainin          -Med Discontinued       Followed by    Dose: 1 mg  Freq: DAILY Route: PO  Start: 18 0800   End: 18   Admin. Amount: 1 tablet (1 × 1 mg tablet)  Dispense Loc: [facility]  Administrations Remainin          -Med Discontinued          Dose: 4 mg  Freq: EVERY 8 HOURS SCHEDULED Route: PO  Start: 18   End: 18   Admin. Amount: 1 tablet (1 × 4 mg tablet)  Dispense Loc: [facility]          -Med Discontinued  ()-Not Given [C]              Rate: 100 mL/hr   Freq: CONTINUOUS Route: IV  Last Dose: Stopped (18 0853)  Start: 18 0845   End: 18 0742   Last Admin: 18 1600  Dispense Loc: [facility] Floor Stock  Volume: 1,000 mL       0845 ( )-New Bag       1511 ( )-Rate/Dose Verify       1600 ( )-Rate/Dose Verify        0742-Med Discontinued  53-Stopped           Medications 18

## 2018-04-18 NOTE — OR NURSING
MD Terry at bedside in PACU.  Verbalized that pt's neuro assessment is worse than in pre op this morning, but no concerns at this time. No orders received, continue to closely monitor.

## 2018-04-18 NOTE — IP AVS SNAPSHOT
` `     [address] [facility] BANK: [phone]                 INTERAGENCY TRANSFER FORM - NOTES (H&P, Discharge Summary, Consults, Procedures, Therapies)   2018                    Hospital Admission Date: 2018  KAVITHA CALVIN   : 1955  Sex: Male        Patient PCP Information     Provider PCP Type    Provider Not In System General         History & Physicals      H&P signed by Ami Provider at 2018  2:49 PM      Author:  Kristin Mueller Service:  (none) Author Type:  Physician    Filed:  2018  2:49 PM Date of Service:  2018 10:48 AM Creation Time:  2018  2:49 PM    Status:  Signed :  Ami Provider (Physician)     Scan on 2018  2:49 PM by Ami, Provider : [facility]-PREOPERATIVE HISTORY AND PHYSICAL 18 1          Revision History        User Key Date/Time User Provider Type Action    > [N/A] 2018  2:49 PM Scan, Provider Physician Sign            H&P by Shaunna Henning PA-C at 2018  3:30 PM     Author:  Shaunna Henning PA-C Service:  (none) Author Type:  Physician Assistant    Filed:  2018  3:52 PM Encounter Date:  2018 Status:  Signed    :  Shaunna Henning PA-C (Physician Assistant)             Pre-Operative H & P     CC:  Preoperative exam to assess for increased cardiopulmonary risk while undergoing surgery and anesthesia.    Date of Encounter: 2018   Primary Care Physician:  No Ref-Primary, Physician   Reason for Visit/Surgery:  Malignant neoplasm of frontal lobe of brain (H) [C71.1]      HPI  Kavitha Calvin is a 62 year old male who presents for pre-operative H & P in preparation for a Left craniotomy for tumor resection, awake on 18 with Dr. Garcia for  a Malignant neoplasm of frontal lobe of brain at the [facility].     Mr. Calvin in the last month started slurring his speech and had right hand weakness.  He was starting to drop items from  his right hand.  He never had symptoms like this before. He underwent a stroke protocol that resulted in an MRI scan of the brain and it showed a neoplasm of the frontal lobe, so the above surgery has been recommended.  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  He has never been a smoker.  He does have interstitial nephritis and is on cyclosporine      History is obtained from the patient and  medical record including Care Everywhere.        Past Medical History  Past Medical History:   Diagnosis Date     Arthritis      GERD (gastroesophageal reflux disease)      Hyperlipidemia      Hypertension      Kidney disorder     Patient takes cyclosporin for an autoimmune kidney disorder, but doen't recall diagnosis and no records        Past Surgical History  Past Surgical History:   Procedure Laterality Date     ESOPHAGOGASTRODUODENOSCOPY  2011       Hx of Blood transfusions/reactions: No transfusion history       Personal or FH with difficulty with Anesthesia:  None    Prior to Admission Medications  Current Outpatient Prescriptions   Medication Sig Dispense Refill     Atorvastatin Calcium (LIPITOR PO) Take 10 mg by mouth every morning        BUMETANIDE PO Take 1 mg by mouth 2 times daily       cycloSPORINE (SANDIMMUNE) 100 MG capsule Take 25 mg by mouth 2 times daily Two tables in the AM, two tablets in PM       METOPROLOL TARTRATE PO Take 25 mg by mouth 3 times daily        POTASSIUM CHLORIDE PO Take 20 mEq by mouth every morning           Allergies  Review of patient's allergies indicates no known allergies.     Social History  Social History     Social History     Marital status:      Spouse name: N/A     Number of children: N/A     Years of education: N/A     Occupational History     Not on file.     Social History Main Topics     Smoking status: Never Smoker     Smokeless tobacco: Never  "Used     Alcohol use No     Drug use: Not on file     Sexual activity: Not on file     Other Topics Concern     Not on file     Social History Narrative          Family History  No family history of bleeding, clotting disorders or complications with anesthesia.      ROS   The complete review of systems is negative other than noted in the HPI or here.   Constitutional: Denies  fevers/chills.    EENT: Denies difficulty swallowing.  Cardiovascular: Denies pain, tightness or squeezing in chest, upper abdomen, shoulder, or neck.  Denies ALMANZAR or orthopnea, palpitations or syncope.  Respiratory: Denies significant shortness of breath or cough.    GI: Denies frequent heartburn, nausea/vomiting     : Denies dysuria   Musculoskeletal: arthritis, Denies joint  swelling.    Skin: Denies rashes, infection or wounds.    Hematologic: Denies prolonged bleeding, anemia or blood clot history  Neurologic: Denies history of stroke, TIA, migraines, seizures, dizziness, numbness/tingling  Psychiatric: Denies changes in mood or affect.      Cardiology Tests: (personally reviewed):   Review Results Below in A/P    Labs: (personally reviewed):[CS1.1]  No results found for: WBC, HGB, IRON, IRONSAT, HCT, PLT, INR, PTT, NA, POTASSIUM, RICHARD, GLC, CR, GFR, BUN, PO4, CO2, ALT, AST, BILITOTAL, ALKPHOS[CS1.2]       Physical Exam:  No LMP for male patient.   Vital signs:  /75  Pulse 78  Temp 98.9  F (37.2  C)  Ht 1.791 m (5' 10.51\")  Wt 71.7 kg (158 lb)  SpO2 97%  BMI 22.34 kg/m2    Constitutional: Awake, alert, cooperative, no apparent distress, and appears stated age.  Eyes: Pupils equal, round and reactive to light, sclera clear, conjunctiva normal.  HENT: Normocephalic, oral pharynx with moist mucus membranes. No goiter appreciated.   Respiratory: Clear to auscultation bilaterally, no crackles or wheezing.  Cardiovascular: Regular rate and rhythm and no overt murmur noted.  No carotid bruits auscultated. No edema. Palpable pulses to " radial  DP and PT arteries.   GI: Normal bowel sounds, soft, non-distended, non-tender, no masses palpated  Skin: Warm and dry.  No rashes at anticipated surgical site.   Musculoskeletal: Full extension of the neck.  No redness, warmth, or swelling of the joints noted. Gross motor strength is normal.    Neurologic: Awake, alert, oriented to name, place and time.  Gait is normal.   Neuropsychiatric: Calm, cooperative. Normal affect.     Assessment/Plan  Sarabjit Calvin is a 62 year old male who presents for pre-operative H & P in preparation for a Left craniotomy for tumor resection, awake on 4/18/18 with Dr. Garcia for  a Malignant neoplasm of frontal lobe of brain at the [facility].    PAC referral for risk assessment and optimization for anesthesia with comorbid conditions of:    Pre-operative considerations:  1.  Cardiac:  Functional status METS >4    Risk of Major Adverse Cardiac event: 0.4%  -HTN controlled with metoprolol(take DOS) and bumetanide(hold DOS)  2.  Pulm:   ELAINE risk:  low  -No known pulmonary disease    3.  Renal:  -Interstitial nephritis, patient on cyclosporine  3.  GI:  Risk of PONV score =2 .  If > 2, anti-emetic intervention recommended.    Discussed the above A/P with Dr. Bazzi.  Patient is optimized and is an acceptable candidate for the proposed procedure.  No further diagnostic evaluation is needed.      AVS given to patient regarding medication instructions,  surgery time/arrival time and NPO status.  Shaunna Henning MS, PA-C   Preoperative Assessment Center  [facility]  Clinic and Surgery Center  Phone: [phone]  Fax: [phone][CS1.1]       Revision History        User Key Date/Time User Provider Type Action    > CS1.2 4/17/2018  3:52 PM Shaunna Henning PA-C Physician Assistant Sign     CS1.1 4/17/2018  3:45 PM Shaunna Henning PA-C Physician Assistant                      Discharge Summaries       Discharge Summaries by Kael Shepherd MD at 4/23/2018  7:22 PM     Author:  Kael Shepherd MD Service:  Neurosurgery Author Type:  Resident    Filed:  4/24/2018  5:59 AM Date of Service:  4/23/2018  7:22 PM Creation Time:  4/23/2018  7:21 PM    Status:  Cosign Needed :  Kael Shepherd MD (Resident)    Cosign Required:  Yes             [facility] Discharge Summary and Instructions    Sarabjit Calvin MRN# [id number]   Age: 62 year old YOB: 1955     Date of Admission:  4/18/2018  Date of Discharge::[SC1.1]  4/24/2018[TD1.1]  Admitting Physician:  Miky Garcia MD  Discharge Physician:  Miky Garcia MD          Admission Diagnoses:   Tumor   Brain lesion          Discharge Diagnosis:[SC1.1]   Glioblastoma (WHO grade IV)             - IDH1 [id number] absent by immunohistochemistry             - ATRX-wild-type by immunohistochemistry[SC1.2]           Procedures:[SC1.1]   4/18/18  1) Left craniotomy for tumor resection, awake  2) Electrocorticography[SC1.2]           Brief History of Illness:[SC1.1]   Mr. Calvin is a 62 year old male who developed slurred speech and right hand weakness in March, 2018.  He underwent MRI imaging which demonstrated a left frontal, pre-motor tumor.  Surgical resection via awake craniotomy was recommended.[SC1.2]             Hospital Course:   Patient underwent above-mentioned procedure on[SC1.1] 4/18/18[SC1.2]. The operation was complicated[SC1.1] by worsened right upper extremity weakness as well as mild-moderate expressive and receptive aphasia.  He[SC1.3] was admitted to the surgical ICU for routine post operative cares.[SC1.1]  Post-operative MRI demonstrated gross-total tumor resection with significant cerebral edema surrounding the resection cavity as well as a left M2 infarct.  He was evaluated by speech therapist who ultimately recommended a regular diet as well as ongoing speech therapy for aphasia.  He was started on 7 days of Keppra for  "seizure prophylaxis (to end 4/25) as well as Decradron (4 mg every 8 hours) for cerebral edema.[SC1.3]   On post operative day[SC1.1] 2[SC1.3] he was doing well and transferred to the floor.[SC1.1]  He continued to work with the speech therapists as well as physical and occupational therapy who recommended post-hospitalization stay at a rehab facility for further acute rehabilitation.  Post-operatively, aphasia and right upper extremity weakness improved significantly.[SC1.4]  On post operative day[SC1.1] 6[SC1.3], he was[SC1.1] working with therapists[SC1.4], voiding without a jennings, eating a regular diet, pain was well cntrolled and therefore he was discharged[SC1.1] to acute rehabilitation.[SC1.4]    MRSA screening[TD1.2] negative from 4/18/18 and VRE screen is pending[TD1.3] at time of discharge[TD1.2].[TD1.3]   Follow-up instructions:   1)[TD1.2] Follow-up with[SC1.1] neurosurgeon[SC1.3] Ezequiel Brown[SC1.5] for post-operative check[SC1.3] in [place][SC1.5]     2)[TD1.2] Will coordinate with local Radiation Oncology and Neuro Oncology team for Oncologic follow-up and care in [place] to minimize travel burden back to [facility].[SC1.5]     3)[TD1.2] Ok to restart cyclosporine on May 9, 2018.[TD1.4]     4) decadron to be continued until follow-up with Oncologic team[TD1.5]   Exam on Discharge[SC1.1]  Physical exam:[TD1.4]   Blood pressure 145/77, pulse 62, temperature 97.7  F (36.5  C), temperature source Oral, resp. rate 16, height 1.778 m (5' 10\"), weight 70.1 kg (154 lb 8.7 oz), SpO2 99 %.[TD1.6]  NEUROLOGIC:  -- Awake; Alert; oriented x 2  -- moderate expressive and mild receptive aphasia, able to follow commands   -- R sided neglect   Cranial Nerves:  -- PERRL 3-2mm bilat and brisk, extraocular movements intact  -- face asymmetric with right lower facial droop   -- palate elevates symmetrically, uvula midline  -- hearing grossly intact   -- Trapezii symmetric strength   Motor:   Delt Bi Tri Hand Flexion/  Extension " Iliopsoas Quadriceps Hamstrings Tibialis Anterior Gastroc    C5 C6 C7 C8/T1 L2 L3 L4-S1 L4 S1   R 4- 4- 4- 4- 5 5 5 5 5   L 5 5 5 5 5 5 5 5 5   Sensory: grossly intact to light touch[TD1.4]           Discharge Medications:[SC1.1]     Current Discharge Medication List      START taking these medications    Details   dexamethasone (DECADRON) 4 MG tablet Take 1 tablet (4 mg) by mouth every 8 hours  Qty: 100 tablet, Refills: 0    Associated Diagnoses: Glioblastoma multiforme (H)      levETIRAcetam (KEPPRA) 750 MG tablet Take 1 tablet (750 mg) by mouth 2 times daily for 1 day  Qty: 2 tablet, Refills: 0    Associated Diagnoses: Glioblastoma multiforme (H)      pantoprazole (PROTONIX) 40 MG EC tablet Take 1 tablet (40 mg) by mouth every morning  Qty: 60 tablet, Refills: 0    Associated Diagnoses: Glioblastoma multiforme (H)         CONTINUE these medications which have CHANGED    Details   atorvastatin (LIPITOR) 10 MG tablet Take 1 tablet (10 mg) by mouth every morning  Qty: 30 tablet, Refills: 0    Associated Diagnoses: Hyperlipidemia, unspecified hyperlipidemia type      cycloSPORINE (SANDIMMUNE) 25 MG capsule Take 2 capsules (50 mg) by mouth 2 times daily  Qty: 100 capsule, Refills: 0    Associated Diagnoses: Interstitial nephritis chronic      metoprolol tartrate (LOPRESSOR) 25 MG tablet Take 1 tablet (25 mg) by mouth 3 times daily  Qty: 60 tablet, Refills: 0    Associated Diagnoses: Hypertension, unspecified type         CONTINUE these medications which have NOT CHANGED    Details   BUMETANIDE PO Take 1 mg by mouth 2 times daily         STOP taking these medications       POTASSIUM CHLORIDE PO Comments:   Reason for Stopping:[TD1.7]                    Discharge Instructions and Follow-Up:   Discharge diet: Regular   Discharge activity: You may advance activity as tolerated. No strenuous exercise or heavy lifting greater than 10 lbs for 4 weeks or until seen and cleared in clinic.   Discharge follow-up: Follow-up  with[SC1.1] neurosurgeon[SC1.3] Ezequiel Brown[SC1.5] for post-operative check[SC1.3] in [place]    Will coordinate with local Radiation Oncology and Neuro Oncology team for Oncologic follow-up and care in [place] to minimize travel burden back to [facility].[SC1.5]     Ok to restart cyclosporine on May 9, 2018.[TD1.4]     Decadron to be continued with dosing to be determined by oncologic team at [place][TD1.5]    Wound care: Ok to shower,however no scrubbing of the wound and no soaking of the wound, meaning no bathtubs or swimming pools. Pat dry only. Leave wound open to air.    Sutures are absorbable and[SC1.1] do not[SC1.3] need to be removed.       Please call if you have:  1. increased pain, redness, drainage, swelling at your incision  2. fevers > 101.5 F degrees  3. with any questions or concerns.  You may reach the Neurosurgery clinic at [phone] during regular work hours. ER at [phone].    and ask for the Neurosurgery Resident on call at [phone], during off hours or weekends.         Discharge Disposition:[SC1.1]   Discharged to acute rehabilitation unit[TD1.4]              Revision History        User Key Date/Time User Provider Type Action    > TD1.5 4/24/2018  5:59 AM Kael Shepherd MD Resident Sign     TD1.7 4/24/2018  5:50 AM Kael Shepherd MD Resident Sign     TD1.3 4/24/2018  5:36 AM Kael Shepherd MD Resident Share     TD1.2 4/24/2018  5:35 AM Kael Shepherd MD Resident Share     TD1.1 4/24/2018  5:29 AM Kael Shepherd MD Resident Share     TD1.6 4/24/2018  5:26 AM Kael Shepherd MD Resident      TD1.4 4/24/2018  5:24 AM Kael Shepherd MD Resident      SC1.4 4/23/2018  9:56 PM Peng Terry MD Resident Share     SC1.5 4/23/2018  9:46 PM Peng Terry MD Resident      SC1.3 4/23/2018  9:31 PM Peng Terry MD Resident      SC1.2 4/23/2018  9:21 PM Peng Terry MD Resident      SC1.1 4/23/2018  7:21 PM Peng Terry MD Resident                      Consult Notes       Consults by Jessie Hardy MD at 2018  4:35 PM     Author:  Jessie Hardy MD Service:  Neuro ICU Author Type:  Resident    Filed:  2018  6:30 PM Date of Service:  2018  4:35 PM Creation Time:  2018  4:35 PM    Status:  Cosign Needed :  Jessie Hardy MD (Resident)    Cosign Required:  Yes             Neuroscience Intensive Care Progress Note[AH1.1]  2018[AH1.2]    Sarabjit Calvin is a 62 year old year old male admitted on 2018 for resection of left frontal lobe mass.    Problem List  1. L frontal lobe mass s/p resection  2. Aphasia  3. Dysarthria  4. Encephalopathy  5. HTN  6. Interstitial nephritis    24 hour events:  Back from OR with some residual aphasia.    24 Hour Vital Signs Summary:  Temperatures:  Current - Temp: 97.8  F (36.6  C); Max - Temp  Av.7  F (37.1  C)  Min: 97.8  F (36.6  C)  Max: 99.3  F (37.4  C)  Respiration range: Resp  Av.5  Min: 16  Max: 20  Pulse range: No Data Recorded  Blood pressure range: Systolic (24hrs), Av , Min:118 , Max:145   ; Diastolic (24hrs), Av, Min:54, Max:74    Pulse oximetry range: SpO2  Av %  Min: 93 %  Max: 99 %    Ventilator Settings[AH1.1]  Resp: 16[AH1.2]      Intake/Output Summary (Last 24 hours) at 18 1636  Last data filed at 18 1200   Gross per 24 hour   Intake             2040 ml   Output             1185 ml   Net              855 ml[AH1.1]       Arterial Line BP: (115-181)/(5-69) 121/49  MAP:  [68 mmHg-105 mmHg] 76 mmHg  BP - Mean:  [69-97] 69[AH1.2]    Current Medications:[AH1.1]    acetaminophen  975 mg Oral Q8H     atorvastatin (LIPITOR) tablet 10 mg  10 mg Oral QAM     bumetanide (BUMEX) tablet 1 mg  1 mg Oral BID     cycloSPORINE  50 mg Oral BID IS     dexamethasone  4 mg Oral Q6H    Or     dexamethasone  4 mg Intravenous Q6H     levETIRAcetam  750 mg Oral BID    Or     levETIRAcetam  750 mg Intravenous BID     metoprolol  10 mg Intravenous Q8H     pantoprazole  " 40 mg Oral QAM    Or     pantoprazole (PROTONIX) IV  40 mg Intravenous QAM     polyethylene glycol  17 g Oral TID     senna-docusate  3 tablet Oral BID     sodium chloride (PF)  3 mL Intracatheter Q8H[AH1.2]     PRN Medications:[AH1.1]  [START ON 4/21/2018] acetaminophen, hydrALAZINE, HYDROmorphone, labetalol, lidocaine 4%, lidocaine (buffered or not buffered), LORazepam, magnesium sulfate, magnesium sulfate, naloxone, ondansetron **OR** ondansetron, oxyCODONE IR, potassium chloride, potassium chloride with lidocaine, potassium chloride, potassium chloride, potassium chloride, potassium phosphate (KPHOS) in D5W IV, potassium phosphate (KPHOS) in D5W IV, potassium phosphate (KPHOS) in D5W IV, potassium phosphate (KPHOS) in D5W IV, potassium phosphate (KPHOS) in D5W IV, prochlorperazine **OR** prochlorperazine, sodium chloride (PF)[AH1.2]    Infusions:[AH1.1]    No Known Allergies[AH1.2]    Physical Examination:[AH1.1]  /54 (BP Location: Right arm)  Pulse 103  Temp 97.8  F (36.6  C) (Axillary)  Resp 16  Ht 1.778 m (5' 10\")  Wt 71.7 kg (158 lb 1.1 oz)  SpO2 98%  BMI 22.68 kg/m2[AH1.2]  Physical Examination:[AH1.3]  /62  Pulse 103  Temp 98.2  F (36.8  C) (Axillary)  Resp 16  Ht 1.778 m (5' 10\")  Wt 71.7 kg (158 lb 1.1 oz)  SpO2 98%  BMI 22.68 kg/m2[AH1.4]  General: NAD, seated in chair   HEENT: Atraumatic, normocephalic.   CV: RRR, no m/r/g. No JVD.   Resp: Symmetric respirations. No use of accessory muscles. CTAB, no wheezes or rhonchi.   Abd: Soft, nontender, nondistended   Skin: No rashes or lesions.  Extremities: No edema  Neurological Examination  Mental status: awake, alert, attentive, unable to answer orientation questions. Spontaneous speech limited to few words/phrases, comprehension intact for most 1 step commands but not all and no 2 step commands. Recent and remote memory intact.  CN: VFF, PERRL, EOMI, face symmetric, facial sensation intact and symmetric, tongue and uvula midline, " shoulder shrug intact.  Motor: moves 4/4 extremities antigravity, normal tone, no abnormal movements  Sensory: intact to light touch in 4/4 extremities  Reflexes: 2+ and symmetric in bilateral biceps, brachioradialis, patellae and achilles. No clonus, toes downgoing.  Coordination and gait: FNF and HS without ataxia or dysmetria. Gait not assessed.[AH1.3]     Labs/Studies:  Recent Labs   Lab Test  04/19/18   0515  04/19/18   0432  04/17/18   1617   NA   --   140  137   POTASSIUM   --   3.8  4.4   CHLORIDE   --   109  104   CO2   --   21  27   ANIONGAP   --   10  7   GLC   --   174*  95   BUN   --   13  16   CR   --   0.78  0.83   RICHARD   --   7.7*  8.3*   WBC  12.4*  13.9*  12.0*   RBC  3.48*  3.55*  4.49   HGB  10.4*  10.7*  13.7   PLT  166  174  196       Recent Labs   Lab Test  04/17/18   1617   INR  0.96     Investigations:[AH1.1]  Prelim surgical path: high grade glioma    MRI brain w/ and w/o contrast:  Impression  1.  Findings suggestive of acute left infarct in the left frontal lobe  likely secondary to involvement of the superior division of the left  MCA. Consider MRI/CT angiography to evaluate vasculature. No  hemorrhage seen within the infarct.     2.  Slight increase in edema surrounding the resection cavity and  minimal increase in the left-to-right midline shift. Basal cisterns  appear patent.     3.  Residual nodular enhancement measuring about 1.5 cm seen along the  medial aspect of the resection cavity.[AH1.5]    Assessment/Plan  Sarabjit Calvin is a 62 year old h/o[AH1.1] HTN and interstitial nephritis[AH1.5] admitted 4/18/2018[AH1.1] for resection of newly diagnosed left frontal lobe mass.[AH1.5]    Plan  Neuro:[AH1.1]  #L frontal lobe mass s/p resection 4/18  POD#1  Prelim path: high grade glioma[AH1.5]  -[AH1.1]continue decadron 4 mg q6  -neuro checks q1 hour  -post-op MRI[AH1.5] completed[AH1.3]    Resp:        CV:[AH1.1]  #HTN  -PTA on bumex 1 mg BID, metoprolol tartrate 25 TID  -continue  bumex  -metoprolol 10 mg IV q8[AH1.3]  -SBP[AH1.1] goal[AH1.3] <[AH1.1] 140    #HLD  -continue PTA atorvastatin 10 mg[AH1.3]    Renal:[AH1.1]  #interstitial nephritis[AH1.3]  -[AH1.1]continue PTA cyclosporine 50 mg BID[AH1.3]    Endo:[AH1.1]  -Fingerstick glucoses while on steroids[AH1.3]  -[AH1.1]hypoglycemia protocol[AH1.3]    Heme:[AH1.1]  #anemia- post-op  -monitor with daily CBC  -transfuse for Hgb < 7.0    #leukocytosis- post-op and secondary to stress demargination[AH1.3]  -[AH1.1]daily CBC[AH1.3]    GI:[AH1.1]  No active issues[AH1.3]  -[AH1.1]bowel reg[AH1.3]    ID:[AH1.1]  No active issues[AH1.3]    FEN:[AH1.1] no mIVF, lytes replacement protocols, DD3, thins[AH1.3]  PPX:    DVT prophylaxis: SCDs,[AH1.1] no chemoppx[AH1.3]    GI:[AH1.1] PPI[AH1.3]  Code Status:[AH1.1] Full[AH1.3]    Dispo:[AH1.1] Continue ICU level cares    The p[AH1.3]atient[AH1.1] was[AH1.3] seen and discussed with[AH1.1] the fellow, Dr. Wise, and the attending,[AH1.3] [AH1.1] Tiny.[AH1.3]    Jessie Hardy MD  Neurology PGY-2  p [phone][AH1.1]     Revision History        User Key Date/Time User Provider Type Action    > AH1.4 4/19/2018  6:30 PM Jessie Hardy MD Resident Sign     AH1.3 4/19/2018  6:08 PM Jessie Hardy MD Resident      AH1.5 4/19/2018  5:38 PM Jessie Hardy MD Resident      AH1.2 4/19/2018  4:36 PM Jessie Hardy MD Resident      AH1.1 4/19/2018  4:35 PM Jessie Hardy MD Resident                      Progress Notes - Physician (Notes from 04/21/18 through 04/24/18)      Progress Notes by Jenifer Zimmerman BSW at 4/23/2018  2:04 PM     Author:  Jenifer Zimmerman BSW Service:  (none) Author Type:      Filed:  4/23/2018  2:11 PM Date of Service:  4/23/2018  2:04 PM Creation Time:  4/23/2018  2:04 PM    Status:  Signed :  Jenifer Zimmerman BSW ()         Social Work Services Progress Note    Hospital Day: 6  Date of Initial Social Work Evaluation:   4/20/18  Collaborated with:  Tammy Osorio NP Neuro Surgery, Admissions at [facility] ([facility]), pt and niece (Jessie)    Data:  Tammy Osorio NP, has communicated readiness for discharge.  An acute rehab stay is recommended.    Intervention:  DEANN spoke with Tammy Osorio NP.  SW spoke with Admissions ([facility] [phone]) at [facility].  [facility] states that they are seeking [employer] authorization for pt's acute rehab stay.  [facility] states that [employer] has requested more current therapy notes.  SW faxed all of pt's speech, occupational and physical therapy progress notes to [facility].  SW updated pt and niece (Jessie).  Jessie states that she will provide pt's transport to the accepting facility.  DEANN updated pt.    Assessment: Pt voices understanding of the discharge plan and agreement with the discharge plan.    Plan:    Anticipated Disposition:  Acute Rehab placement at [facility]    Barriers to d/c plan:  [facility] is awaiting [place] authorization.    Follow Up:  DEANN will continue to follow.    NAEEM Ramos  Social Work, 6A  Phone:  [phone]  Pager:  [phone]  4/23/2018[TK1.1]             Revision History        User Key Date/Time User Provider Type Action    > TK1.1 4/23/2018  2:11 PM Jenifer Zimmerman BSW  Sign            Progress Notes by Jeremy Guzman MD at 4/22/2018  8:48 AM     Author:  Jeremy Guzman MD Service:  Neurosurgery Author Type:  Resident    Filed:  4/22/2018  8:49 AM Date of Service:  4/22/2018  8:48 AM Creation Time:  4/22/2018  8:48 AM    Status:  Attested :  Jeremy Guzman MD (Resident)    Cosigner:  Brenda Garcia MD at 4/22/2018  1:10 PM        Attestation signed by Brenda Garcia MD at 4/22/2018  1:10 PM        I have seen this patient with the resident and formulated a plan and agree with this note.  AMP                               Name: Sarabjit Calvin  Age/Sex: 62 M  Rm: [id number]  MRN:  [id number]  Staff: Radha  Date of Admission:   Primary Service: Neurosurgery    HPI: 62 RH M with several week history of speech slurring associated with right handed clumsiness. The patient underwent an MRI that reportedly showed a left frontal lesion. The patient was prescribed Decadron and referred for consideration of craniotomy.    Procedures:  : Stealth Guided Left Craniotomy for Tumor Resection, Awake    Daily events:   : Had Fall in Room when attempting to get up independently; Transferred to Unit 6A    Imagin/19: MRI Brain: Edema surrounding resection cavity; Restricted diffusion in Left M2 Territory    Examination:  AOx2.   CN II - XII intact except for right lower facial droop  Moderate expressive > receptive aphasia, able to follow commands.   Strength 4-/5 in RUE with R arm neglect otherwise intact     Assessment/Plan of care: 62 year old male POD#4 from awake craniotomy for tumor resection.  Neurological status is stable.      NEURO:   - Post-Operative Pain Control  - Neuro Examination Q 4 HR  - Keppra 1000 bid for seizure ppx x 7 days ()  - Decadron for cerebral edema with brain compression   - F/U Surgical Pathology  - Added to Brain Tumor Board (Will discuss on )    CV:  - Goal SBP < 160 mmHg    PULM:   - Incentive Spirometry Q 1 HR     GI/FEN:  - Dysphagia: SLP Evaluation Completed; On Dysphagia Diet Level 3 w/ Thin Liquids   - GI Prophylaxis: Protonix 40 mg QD   - Electrolyte Replacement Protocol  - Bowel Regimen w/ Senna and Miralax    RENAL/:        - Voiding  -  Continue 0.9 NaCl 100 mL/hr    HEME/ID:  - Plts > 100k, hgb > 8, INR < 1.5   - DVT Prophylaxis: SCDs to BLE    ENDO:   -Insulin Sliding Scale with concurrent use of Dexamethasone    DISPO:  Likely ARU; Anticipate Readiness for D/C   Barriers: Pending post-op needs and PT evaluation  Activity: Up in Chair TID; Ambulate w/ Assist  PT: Evaluation Pending  OT: ARU  SLP: ARU      Contact the neurosurgery  resident on call with questions.    Dial * * * [phone]: Enter job code [id number] when prompted.    Jeremy Guzman MD  Neurosurgery PGY2[BL1.1]       Revision History        User Key Date/Time User Provider Type Action    > BL1.1 4/22/2018  8:49 AM Jeremy Guzman MD Resident Sign                  Procedure Notes     No notes of this type exist for this encounter.      Progress Notes - Therapies (Notes from 04/21/18 through 04/24/18)     No notes of this type exist for this encounter.

## 2018-04-18 NOTE — OR NURSING
Pt has a temp of 100.1 oral this am. He also has psoriasis to his legs which appears like larch patches of fine petecchiae, noted on both calves.

## 2018-04-18 NOTE — ANESTHESIA PROCEDURE NOTES
Arterial Line Procedure Note  Staff:     Anesthesiologist:  CLIVE BROWN    Resident/CRNA:  ALYCE HUIZAR    Arterial line performed by resident/CRNA in presence of a teaching physician    Location: In OR Before Induction  Procedure Start/Stop Times:     patient identified, IV checked, site marked, risks and benefits discussed, informed consent, monitors and equipment checked, pre-op evaluation and at physician/surgeon's request      Correct Patient: Yes      Correct Position: Yes      Correct Site: Yes      Correct Procedure: Yes      Correct Laterality:  Yes    Site Marked:  Yes  Line Placement:     Procedure:  Arterial Line    Insertion Site:  Radial    Insertion laterality:  Left    Skin Prep: Chloraprep      Patient Prep: patient draped, mask, sterile gloves, hat and hand hygiene      Local skin infiltration:  1% lidocaine    amount (mL):  2    Catheter size:  20 gauge, Quick cath    Cath secured with: suture      Dressing:  Tegaderm    Complications:  None obvious    Arterial waveform: Yes      IBP within 10% of NIBP: Yes

## 2018-04-18 NOTE — OR NURSING
"Pt c/o feeling hot before transfer to  and blankets were removed, but upon reaching , he started to shake, and said \"yuh\" when asked if he was cold, and \"yuh\" again when he was asked if he was shivering. On the hand-off to Alejandra Garcia RN, he was asked orientation questions, and answered inconsistently. Speech was garbled and sometimes inappropriate. He did not answer all questions correctly, even when given choices. When asked what surgery he had, he answered \"yup\" to all choices given which were \"surgery on your brain\", \"surgery on your heart\", and \"surgery on your lungs\". The more he was questioned, the less oriented became his responses. He performed most actions correctly, except he needed cueing and repeated demonstration to stick out his tongue, and was unable to follow directions to raise his arms palms up. I had noticed in PACU that repeated questioning did not give improved responses. His clearest and most accurate responses were to the Initial questions on neuro checks, and to simple commands, with a minimum of dialogue in between. He was alert and spontaneous and responsive.   "

## 2018-04-18 NOTE — OR NURSING
Dr. Manzano at bedside. Reported elevated BPs. Will give 10 mg Labetolol IV now and also the po Metoprolol scheduled.

## 2018-04-18 NOTE — BRIEF OP NOTE
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  Procedure: Procedure(s):  Stealth Guided Left Craniotomy for Tumor Resection, Awake  - Wound Class: I-Clean  Surgeon: Surgeon(s) and Role:     * Miky Garcia MD - Primary     * Edmundo Garza MD - Resident - Assisting     * Peng Terry MD - Resident - Assisting  Anesthesia: Other   Estimated blood loss: 25 mL  Drains:  None    Specimens:   ID Type Source Tests Collected by Time Destination   A : Left Frontal Brain Tumor Tissue Brain SURGICAL PATHOLOGY EXAM Miky Garcia MD 4/18/2018  9:17 AM    B : Left Frontal Brain Tumor Tissue Brain SURGICAL PATHOLOGY EXAM Miky Garcia MD 4/18/2018  9:57 AM      Findings:   Frozen pathology showed high grade glioma.  Complications: None.  Implants:  Synthes cranial plating system.      -----  Edmundo Garza MD  PGY-7, Neurosurgery    Please dial * * * 7 and enter job code [phone] to reach the neurosurgery team.

## 2018-04-18 NOTE — ANESTHESIA CARE TRANSFER NOTE
Patient: Sarabjit Calvin    Procedure(s):  Stealth Guided Left Craniotomy for Tumor Resection (Awake) - Wound Class: I-Clean    Diagnosis: Tumor   Diagnosis Additional Information: No value filed.    Anesthesia Type:   MAC     Note:  Airway :Face Mask  Patient transferred to:PACU  Comments: Patient transported to PACU breathing spontaneously with O2 via face mask. HDS in PACU. Reports no pain/nausea. Report given to RN. Handoff Report: Identifed the Patient, Identified the Reponsible Provider, Reviewed the pertinent medical history, Discussed the surgical course, Reviewed Intra-OP anesthesia mangement and issues during anesthesia, Set expectations for post-procedure period and Allowed opportunity for questions and acknowledgement of understanding      Vitals: (Last set prior to Anesthesia Care Transfer)    CRNA VITALS  4/18/2018 1000 - 4/18/2018 1040      4/18/2018             Pulse: 83    SpO2: 97 %                Electronically Signed By: Thuy Tidwell MD  April 18, 2018  10:40 AM

## 2018-04-18 NOTE — OR NURSING
MD paged #[phone]: Notified that pt is having difficulty coming up with any words, can not answer any questions appropriately, R sided weak, and would like an MD at the bedside in PACU. MD verbalized understanding, awaiting MD assessment in PACU.

## 2018-04-18 NOTE — IP AVS SNAPSHOT
"` `           UNIT 6A [facility]: [phone]                                              INTERAGENCY TRANSFER FORM - NURSING   2018                    Hospital Admission Date: 2018  KAVITHA SHAFFER   : 1955  Sex: Male        Attending Provider: Miky Garcia MD     Allergies:  No Known Allergies    Infection:  None   Service:  NEUROSURGERY    Ht:  1.778 m (5' 10\")   Wt:  70.1 kg (154 lb 8.7 oz)   Admission Wt:  71.7 kg (158 lb 1.1 oz)    BMI:  22.17 kg/m 2   BSA:  1.86 m 2            Patient PCP Information     Provider PCP Type    Provider Not In System General      Current Code Status     Date Active Code Status Order ID Comments User Context       Prior      Code Status History     Date Active Date Inactive Code Status Order ID Comments User Context    2018  5:46 AM  Full Code [id number]  Peng Terry MD Outpatient      Advance Directives        Scanned docmt in ACP Activity?           No scanned doc        Hospital Problems as of 2018              Priority Class Noted POA    Glioblastoma multiforme (H) Medium  2018 Yes    Aphasia Medium  2018 Unknown    Hemiparesis (H) Medium  2018 Unknown      Non-Hospital Problems as of 2018     None      Immunizations     None         END      ASSESSMENT     Discharge Profile Flowsheet     EXPECTED DISCHARGE     Inspection of bony prominences  Full 18 0132    Expected Discharge Date  18 0849   Inspection under devices  Full 18 0132    DISCHARGE NEEDS ASSESSMENT     Skin WDL  ex 18 0132    Equipment Currently Used at Home  grab bar 18 0905   Skin Temperature  warm 18 0132    Transportation Available  car 18 0905   Skin Moisture  dry 18 1428    GASTROINTESTINAL (ADULT,PEDIATRIC,OB)     Skin Integrity  bruise(s);incision(s) 18 0132    GI WDL  WDL 18 2043   SAFETY      Last Bowel Movement  18 2043   Safety WDL  WDL 18 " "2104    Passing flatus  yes 04/23/18 2043   Safety Factors  bed in low position;wheels locked;call light in reach;upper side rails raised x 2;ID band on 04/23/18 2104    COMMUNICATION ASSESSMENT     Safety Equipment  oxygen flowmeter;suction regulator 04/22/18 1942    Patient's communication style  spoken language (English or Bilingual) 04/17/18 1545   All Alarms  alarm(s) activated and audible 04/22/18 1052    SKIN                        Assessment WDL (Within Defined Limits) Definitions           Safety WDL     Effective: 09/28/15    Row Information: <b>WDL Definition:</b> Bed in low position, wheels locked; call light in reach; upper side rails up x 2; ID band on<br> <font color=\"gray\"><i>Item=AS safety wdl>>List=AS safety wdl>>Version=F14</i></font>      Skin WDL     Effective: 09/28/15    Row Information: <b>WDL Definition:</b> Warm; dry; intact; elastic; without discoloration; pressure points without redness<br> <font color=\"gray\"><i>Item=AS skin wdl>>List=AS skin wdl>>Version=F14</i></font>      Vitals     Vital Signs Flowsheet     COMMENTS     Vocalization (extubated patients)  0 04/20/18 1252    Comments  In MRI 04/19/18 0828   Muscle Tension  0 04/20/18 1252    VITAL SIGNS     Total  0 04/20/18 1252    Temp  98.4  F (36.9  C) 04/24/18 0610   REJI COMA SCALE      Temp src  Oral 04/24/18 0610   Best Eye Response  4-->(E4) spontaneous 04/24/18 0143    Resp  16 04/24/18 0610   Best Motor Response  6-->(M6) obeys commands 04/24/18 0143    Pulse  62 04/23/18 2001   Best Verbal Response  5-->(V5) oriented 04/24/18 0143    Heart Rate  64 04/24/18 0610   Dunbar Coma Scale Score  15 04/24/18 0143    Pulse/Heart Rate Source  Monitor 04/23/18 2001   HEIGHT AND WEIGHT      BP  165/86 04/24/18 0610   Height  1.778 m (5' 10\") 04/18/18 0546    BP Location  Right arm 04/23/18 2001   Weight  70.1 kg (154 lb 8.7 oz) 04/20/18 0407    OXYGEN THERAPY     Weight Method  Bed scale 04/20/18 0407    SpO2  99 % 04/24/18 0341   " BSA (Calculated - sq m)  1.88 04/18/18 0546    O2 Device  None (Room air) 04/24/18 0341   BMI (Calculated)  22.73 04/18/18 0546    Oxygen Delivery  2 LPM 04/18/18 1607   POSITIONING      RESPIRATORY MONITORING     Body Position  independently positioning 04/24/18 0610    Respiratory Monitoring (EtCO2)  30 mmHg 04/18/18 1850   Head of Bed (HOB)  HOB at 20-30 degrees 04/24/18 0610    Integrated Pulmonary Index (IPI)  8-9 04/18/18 1850   Positioning/Transfer Devices  pillows 04/24/18 0610    PAIN/COMFORT     Chair  Upright in chair 04/24/18 0610    Patient Currently in Pain  denies 04/23/18 2036   DAILY CARE      Preferred Pain Scale  CAPA (Clinically Aligned Pain Assessment) ([facility] Adults Only) 04/23/18 1421   Activity Management  activity adjusted per tolerance 04/24/18 0341    Pain Intervention(s)  Repositioned 04/19/18 0513   Activity Assistance Provided  assistance, 1 person 04/24/18 0341    CLINICALLY ALIGNED PAIN ASSESSMENT (CAPA) ([facility] ADULTS ONLY)     Assistive Device Utilized  gait belt;walker 04/24/18 0341    Comfort  negligible pain 04/23/18 2036   ECG      Change in Pain  getting better 04/20/18 0004   ECG Rhythm  Normal sinus rhythm 04/20/18 0413    Pain Control  fully effective 04/20/18 0004   Ectopy  None 04/20/18 0413    Functioning  can do everything I need to 04/20/18 0004   Lead Monitored  Lead II;V 1 04/20/18 0413    Sleep  normal sleep 04/20/18 0004   ANALGESIA SIDE EFFECTS MONITORING      CRITICAL-CARE PAIN OBSERVATION TOOL (CPOT)     Side Effects Monitoring: Respiratory Quality  R 04/20/18 2012    Facial Expression  0 04/20/18 1252   Side Effects Monitoring: Respiratory Depth  N 04/20/18 2012    Body Movements  0 04/20/18 1252   Side Effects Monitoring: Sedation Level  1 04/20/18 2012    Compliance w/ventilator (intubated patients)  Extubated 04/20/18 1252                 Patient Lines/Drains/Airways Status    Active LINES/DRAINS/AIRWAYS     Name:  Placement date: Placement time: Site: Days: Last dressing change:    Incision/Surgical Site 04/18/18 Lateral;Left Head 04/18/18   0843    5     Incision/Surgical Site 04/18/18 Left Head 04/18/18   1016    5             Patient Lines/Drains/Airways Status    Active PICC/CVC     None            Intake/Output Detail Report     Date Intake     Output   Net    Shift P.O. I.V. IV Piggyback Total Urine Blood Total       Noc 04/22/18 1500 - 04/22/18 2359 480 -- -- 480 100 -- 100 380    Day 04/23/18 0000 - 04/23/18 0659 -- -- -- -- 500 -- 500 -500    Lauren 04/23/18 0700 - 04/23/18 1459 -- -- -- -- -- -- -- 0    Noc 04/23/18 1500 - 04/23/18 2359 100 -- -- 100 250 -- 250 -150    Day 04/24/18 0000 - 04/24/18 0659 -- -- -- -- 950 -- 950 -950      Last Void/BM       Most Recent Value    Urine Occurrence 1 at 04/24/2018 0039    Stool Occurrence 2 at 04/20/2018 0945      Case Management/Discharge Planning     Case Management/Discharge Planning Flowsheet     REFERRAL INFORMATION     FINAL RESOURCES      Admission Type  inpatient 04/20/18 1520   Equipment Currently Used at Home  grab bar 04/20/18 0905    LIVING ENVIRONMENT     MH/BH CAREGIVER      Lives With  alone 04/20/18 0905   Filed Complexity Screen Score  5 04/20/18 1522    Living Arrangements  house 04/20/18 0905   ABUSE RISK SCREEN      COPING/STRESS     QUESTION TO PATIENT:  Has a member of your family or a partner(now or in the past) intimidated, hurt, manipulated, or controlled you in any way?  no 04/18/18 0618    Major Change/Loss/Stressor  surgery/procedure 04/17/18 1545   QUESTION TO PATIENT: Do you feel safe going back to the place where you are living?  yes 04/18/18 0618    EXPECTED DISCHARGE     OBSERVATION: Is there reason to believe there has been maltreatment of a vulnerable adult (ie. Physical/Sexual/Emotional abuse, self neglect, lack of adequate food, shelter, medical care, or financial exploitation)?  no 04/18/18 0618    Expected Discharge Date  04/20/18  04/23/18 0849   OTHER      DISCHARGE PLANNING     Are you depressed or being treated for depression?  No 04/20/18 2203    Transportation Available  car 04/20/18 0904

## 2018-04-18 NOTE — IP AVS SNAPSHOT
` ` Patient Information     Patient Name Sex Sarabjit Hutchinson ([id number]) Male 1955       Room Bed    [facility] 6212-02      Patient Demographics     Address Phone E-mail Address    [address] [place] [address] [phone] (Home) [email or web address]      Patient Ethnicity & Race     Ethnic Group Patient Race    American White      Emergency Contact(s)     Name Relation Home Work Mobile    Jessie Alston Relative none  [phone]    Sushma Sister   [phone]      Documents on File        Status Date Received Description       Documents for the Patient    Consent for EHR Access       Insurance Card Received 18 BCBS card    External Medication Information Consent       Patient ID       [facility] Specified Other       Consent for Services/Privacy Notice - Hospital/Clinic Received 18     Privacy Notice - [facility] Received 18     Care Everywhere Prospective Auth Received 18     Consent for EHR Access-Received-[facility] Received 18     Business/Insurance/Care Coordination/Health Form - Patient  18 APPROVAL OF  OUTPATIENT SERVICES    Consent for Services - [facility]       Consent for Services - Hospital and Clinic Received 18     HIE Auth Received 18     Patient Photo   Photo of Patient       Documents for the Encounter    CMS IM for Patient Signature       H/P - History and Physical  18 M HEALTH-PREOPERATIVE HISTORY AND PHYSICAL 18    Assessment/Questionnaire  18 MRI HISTORY QUESTIONNAIRE AND CONTRAST NOTICE      Admission Information     Attending Provider Admitting Provider Admission Type Admission Date/Time    Miky Garcia MD Chen, Clark Chin-Chung, MD Elective 18  0532    Discharge Date Hospital Service Auth/Cert Status Service Area     Neurosurgery [facility]    Unit Room/Bed Admission Status       UU U6A [id number]- Admission (Confirmed)       Admission     Complaint    Tumor , Brain lesion       Hospital Account     Name Acct ID Class Status Primary Coverage    Sarabjit Calvin [id number] Inpatient Open BCBS - BCBS OUT OF STATE            Guarantor Account (for Hospital Account #[id number])     Name Relation to Pt Service Area Active? Acct Type    Sarabjit Calvin Self FCS Yes Personal/Family    Address Phone          [address]  [place], [place] [id number] [phone](H)              Coverage Information (for Hospital Account #[id number])     F/O Payor/Plan Precert #    BCBS/BCBS OUT OF STATE     Subscriber Subscriber #    Sarabjit Calvin [id number]    Address Phone    [address] [address]  [place] [address] [phone]

## 2018-04-18 NOTE — OR NURSING
MD Terry text paged: pt's niece, Khushi, would like a doctor to come see her in waiting room for an update.

## 2018-04-19 ENCOUNTER — APPOINTMENT (OUTPATIENT)
Dept: MRI IMAGING | Facility: CLINIC | Age: 63
End: 2018-04-19
Attending: STUDENT IN AN ORGANIZED HEALTH CARE EDUCATION/TRAINING PROGRAM
Payer: COMMERCIAL

## 2018-04-19 ENCOUNTER — APPOINTMENT (OUTPATIENT)
Dept: SPEECH THERAPY | Facility: CLINIC | Age: 63
End: 2018-04-19
Attending: STUDENT IN AN ORGANIZED HEALTH CARE EDUCATION/TRAINING PROGRAM
Payer: COMMERCIAL

## 2018-04-19 ENCOUNTER — APPOINTMENT (OUTPATIENT)
Dept: PHYSICAL THERAPY | Facility: CLINIC | Age: 63
End: 2018-04-19
Attending: STUDENT IN AN ORGANIZED HEALTH CARE EDUCATION/TRAINING PROGRAM
Payer: COMMERCIAL

## 2018-04-19 LAB
ANION GAP SERPL CALCULATED.3IONS-SCNC: 10 MMOL/L (ref 3–14)
BUN SERPL-MCNC: 13 MG/DL (ref 7–30)
CALCIUM SERPL-MCNC: 7.7 MG/DL (ref 8.5–10.1)
CHLORIDE SERPL-SCNC: 109 MMOL/L (ref 94–109)
CO2 SERPL-SCNC: 21 MMOL/L (ref 20–32)
CREAT SERPL-MCNC: 0.78 MG/DL (ref 0.66–1.25)
ERYTHROCYTE [DISTWIDTH] IN BLOOD BY AUTOMATED COUNT: 13.4 % (ref 10–15)
ERYTHROCYTE [DISTWIDTH] IN BLOOD BY AUTOMATED COUNT: 13.6 % (ref 10–15)
GFR SERPL CREATININE-BSD FRML MDRD: >90 ML/MIN/1.7M2
GLUCOSE BLDC GLUCOMTR-MCNC: 153 MG/DL (ref 70–99)
GLUCOSE SERPL-MCNC: 174 MG/DL (ref 70–99)
HCT VFR BLD AUTO: 31.2 % (ref 40–53)
HCT VFR BLD AUTO: 31.6 % (ref 40–53)
HGB BLD-MCNC: 10.4 G/DL (ref 13.3–17.7)
HGB BLD-MCNC: 10.7 G/DL (ref 13.3–17.7)
MAGNESIUM SERPL-MCNC: 2.2 MG/DL (ref 1.6–2.3)
MCH RBC QN AUTO: 29.9 PG (ref 26.5–33)
MCH RBC QN AUTO: 30.1 PG (ref 26.5–33)
MCHC RBC AUTO-ENTMCNC: 33.3 G/DL (ref 31.5–36.5)
MCHC RBC AUTO-ENTMCNC: 33.9 G/DL (ref 31.5–36.5)
MCV RBC AUTO: 89 FL (ref 78–100)
MCV RBC AUTO: 90 FL (ref 78–100)
PHOSPHATE SERPL-MCNC: 2.4 MG/DL (ref 2.5–4.5)
PLATELET # BLD AUTO: 166 10E9/L (ref 150–450)
PLATELET # BLD AUTO: 174 10E9/L (ref 150–450)
POTASSIUM SERPL-SCNC: 3.8 MMOL/L (ref 3.4–5.3)
RADIOLOGIST FLAGS: ABNORMAL
RBC # BLD AUTO: 3.48 10E12/L (ref 4.4–5.9)
RBC # BLD AUTO: 3.55 10E12/L (ref 4.4–5.9)
SODIUM SERPL-SCNC: 140 MMOL/L (ref 133–144)
WBC # BLD AUTO: 12.4 10E9/L (ref 4–11)
WBC # BLD AUTO: 13.9 10E9/L (ref 4–11)

## 2018-04-19 PROCEDURE — 97530 THERAPEUTIC ACTIVITIES: CPT | Mod: GP | Performed by: PHYSICAL THERAPIST

## 2018-04-19 PROCEDURE — 92610 EVALUATE SWALLOWING FUNCTION: CPT | Mod: GN | Performed by: SPEECH-LANGUAGE PATHOLOGIST

## 2018-04-19 PROCEDURE — A9585 GADOBUTROL INJECTION: HCPCS | Performed by: NEUROLOGICAL SURGERY

## 2018-04-19 PROCEDURE — 25000132 ZZH RX MED GY IP 250 OP 250 PS 637: Performed by: STUDENT IN AN ORGANIZED HEALTH CARE EDUCATION/TRAINING PROGRAM

## 2018-04-19 PROCEDURE — 25000131 ZZH RX MED GY IP 250 OP 636 PS 637: Performed by: STUDENT IN AN ORGANIZED HEALTH CARE EDUCATION/TRAINING PROGRAM

## 2018-04-19 PROCEDURE — 25000128 H RX IP 250 OP 636: Performed by: NEUROLOGICAL SURGERY

## 2018-04-19 PROCEDURE — 20000004 ZZH R&B ICU UMMC

## 2018-04-19 PROCEDURE — 85027 COMPLETE CBC AUTOMATED: CPT | Performed by: STUDENT IN AN ORGANIZED HEALTH CARE EDUCATION/TRAINING PROGRAM

## 2018-04-19 PROCEDURE — 70553 MRI BRAIN STEM W/O & W/DYE: CPT

## 2018-04-19 PROCEDURE — 40000141 ZZH STATISTIC PERIPHERAL IV START W/O US GUIDANCE

## 2018-04-19 PROCEDURE — 97161 PT EVAL LOW COMPLEX 20 MIN: CPT | Mod: GP | Performed by: PHYSICAL THERAPIST

## 2018-04-19 PROCEDURE — 40000275 ZZH STATISTIC RCP TIME EA 10 MIN

## 2018-04-19 PROCEDURE — 83735 ASSAY OF MAGNESIUM: CPT | Performed by: STUDENT IN AN ORGANIZED HEALTH CARE EDUCATION/TRAINING PROGRAM

## 2018-04-19 PROCEDURE — 25000125 ZZHC RX 250: Performed by: STUDENT IN AN ORGANIZED HEALTH CARE EDUCATION/TRAINING PROGRAM

## 2018-04-19 PROCEDURE — 40000014 ZZH STATISTIC ARTERIAL MONITORING DAILY

## 2018-04-19 PROCEDURE — 00000146 ZZHCL STATISTIC GLUCOSE BY METER IP

## 2018-04-19 PROCEDURE — 80048 BASIC METABOLIC PNL TOTAL CA: CPT | Performed by: STUDENT IN AN ORGANIZED HEALTH CARE EDUCATION/TRAINING PROGRAM

## 2018-04-19 PROCEDURE — 97116 GAIT TRAINING THERAPY: CPT | Mod: GP | Performed by: PHYSICAL THERAPIST

## 2018-04-19 PROCEDURE — 25000128 H RX IP 250 OP 636: Performed by: STUDENT IN AN ORGANIZED HEALTH CARE EDUCATION/TRAINING PROGRAM

## 2018-04-19 PROCEDURE — 40000225 ZZH STATISTIC SLP WARD VISIT: Performed by: SPEECH-LANGUAGE PATHOLOGIST

## 2018-04-19 PROCEDURE — 40000193 ZZH STATISTIC PT WARD VISIT: Performed by: PHYSICAL THERAPIST

## 2018-04-19 PROCEDURE — 84100 ASSAY OF PHOSPHORUS: CPT | Performed by: STUDENT IN AN ORGANIZED HEALTH CARE EDUCATION/TRAINING PROGRAM

## 2018-04-19 RX ORDER — GADOBUTROL 604.72 MG/ML
7.5 INJECTION INTRAVENOUS ONCE
Status: COMPLETED | OUTPATIENT
Start: 2018-04-19 | End: 2018-04-19

## 2018-04-19 RX ORDER — LORAZEPAM 2 MG/ML
1 INJECTION INTRAMUSCULAR
Status: DISCONTINUED | OUTPATIENT
Start: 2018-04-19 | End: 2018-04-20

## 2018-04-19 RX ADMIN — HYDRALAZINE HYDROCHLORIDE 10 MG: 20 INJECTION INTRAMUSCULAR; INTRAVENOUS at 14:01

## 2018-04-19 RX ADMIN — LEVETIRACETAM 750 MG: 100 INJECTION, SOLUTION INTRAVENOUS at 07:37

## 2018-04-19 RX ADMIN — METOPROLOL TARTRATE 10 MG: 5 INJECTION INTRAVENOUS at 01:24

## 2018-04-19 RX ADMIN — ONDANSETRON 4 MG: 2 INJECTION INTRAMUSCULAR; INTRAVENOUS at 04:19

## 2018-04-19 RX ADMIN — GADOBUTROL 7.5 ML: 604.72 INJECTION INTRAVENOUS at 08:46

## 2018-04-19 RX ADMIN — CYCLOSPORINE 50 MG: 25 CAPSULE, LIQUID FILLED ORAL at 20:15

## 2018-04-19 RX ADMIN — SODIUM CHLORIDE: 9 INJECTION, SOLUTION INTRAVENOUS at 01:20

## 2018-04-19 RX ADMIN — POTASSIUM PHOSPHATE, MONOBASIC AND POTASSIUM PHOSPHATE, DIBASIC 15 MMOL: 224; 236 INJECTION, SOLUTION INTRAVENOUS at 09:54

## 2018-04-19 RX ADMIN — HYDRALAZINE HYDROCHLORIDE 10 MG: 20 INJECTION INTRAMUSCULAR; INTRAVENOUS at 02:16

## 2018-04-19 RX ADMIN — DEXAMETHASONE SODIUM PHOSPHATE 4 MG: 10 INJECTION, SOLUTION INTRAMUSCULAR; INTRAVENOUS at 06:50

## 2018-04-19 RX ADMIN — DEXAMETHASONE SODIUM PHOSPHATE 4 MG: 10 INJECTION, SOLUTION INTRAMUSCULAR; INTRAVENOUS at 13:07

## 2018-04-19 RX ADMIN — METOPROLOL TARTRATE 10 MG: 5 INJECTION INTRAVENOUS at 18:00

## 2018-04-19 RX ADMIN — HYDRALAZINE HYDROCHLORIDE 10 MG: 20 INJECTION INTRAMUSCULAR; INTRAVENOUS at 07:51

## 2018-04-19 RX ADMIN — BUMETANIDE 1 MG: 1 TABLET ORAL at 20:15

## 2018-04-19 RX ADMIN — Medication 10 MEQ: at 17:15

## 2018-04-19 RX ADMIN — METOPROLOL TARTRATE 10 MG: 5 INJECTION INTRAVENOUS at 23:45

## 2018-04-19 RX ADMIN — METOPROLOL TARTRATE 10 MG: 5 INJECTION INTRAVENOUS at 09:53

## 2018-04-19 RX ADMIN — ACETAMINOPHEN 975 MG: 325 TABLET, FILM COATED ORAL at 23:43

## 2018-04-19 RX ADMIN — DEXAMETHASONE SODIUM PHOSPHATE 4 MG: 10 INJECTION, SOLUTION INTRAMUSCULAR; INTRAVENOUS at 18:01

## 2018-04-19 RX ADMIN — LEVETIRACETAM 750 MG: 100 INJECTION, SOLUTION INTRAVENOUS at 20:11

## 2018-04-19 RX ADMIN — PANTOPRAZOLE SODIUM 40 MG: 40 INJECTION, POWDER, FOR SOLUTION INTRAVENOUS at 07:36

## 2018-04-19 RX ADMIN — Medication 10 MEQ: at 14:08

## 2018-04-19 RX ADMIN — DEXAMETHASONE SODIUM PHOSPHATE 4 MG: 10 INJECTION, SOLUTION INTRAMUSCULAR; INTRAVENOUS at 23:45

## 2018-04-19 RX ADMIN — DEXAMETHASONE SODIUM PHOSPHATE 4 MG: 10 INJECTION, SOLUTION INTRAMUSCULAR; INTRAVENOUS at 01:09

## 2018-04-19 RX ADMIN — SENNOSIDES AND DOCUSATE SODIUM 3 TABLET: 8.6; 5 TABLET ORAL at 20:15

## 2018-04-19 ASSESSMENT — VISUAL ACUITY
OU: NORMAL ACUITY

## 2018-04-19 NOTE — PLAN OF CARE
Problem: Patient Care Overview  Goal: Plan of Care/Patient Progress Review  OT4A: CX: Pt away at scan and had recent decreased R sided function. Will follow up tomorrow.

## 2018-04-19 NOTE — CONSULTS
Neuroscience Intensive Care Progress Note  2018    Sarabjit Calvin is a 62 year old year old male admitted on 2018 for resection of left frontal lobe mass.    Problem List  1. L frontal lobe mass s/p resection  2. Aphasia  3. Dysarthria  4. Encephalopathy  5. HTN  6. Interstitial nephritis    24 hour events:  Back from OR with some residual aphasia.    24 Hour Vital Signs Summary:  Temperatures:  Current - Temp: 97.8  F (36.6  C); Max - Temp  Av.7  F (37.1  C)  Min: 97.8  F (36.6  C)  Max: 99.3  F (37.4  C)  Respiration range: Resp  Av.5  Min: 16  Max: 20  Pulse range: No Data Recorded  Blood pressure range: Systolic (24hrs), Av , Min:118 , Max:145   ; Diastolic (24hrs), Av, Min:54, Max:74    Pulse oximetry range: SpO2  Av %  Min: 93 %  Max: 99 %    Ventilator Settings  Resp: 16      Intake/Output Summary (Last 24 hours) at 18 1636  Last data filed at 18 1200   Gross per 24 hour   Intake             2040 ml   Output             1185 ml   Net              855 ml       Arterial Line BP: (115-181)/(5-69) 121/49  MAP:  [68 mmHg-105 mmHg] 76 mmHg  BP - Mean:  [69-97] 69    Current Medications:    acetaminophen  975 mg Oral Q8H     atorvastatin (LIPITOR) tablet 10 mg  10 mg Oral QAM     bumetanide (BUMEX) tablet 1 mg  1 mg Oral BID     cycloSPORINE  50 mg Oral BID IS     dexamethasone  4 mg Oral Q6H    Or     dexamethasone  4 mg Intravenous Q6H     levETIRAcetam  750 mg Oral BID    Or     levETIRAcetam  750 mg Intravenous BID     metoprolol  10 mg Intravenous Q8H     pantoprazole  40 mg Oral QAM    Or     pantoprazole (PROTONIX) IV  40 mg Intravenous QAM     polyethylene glycol  17 g Oral TID     senna-docusate  3 tablet Oral BID     sodium chloride (PF)  3 mL Intracatheter Q8H     PRN Medications:  [START ON 2018] acetaminophen, hydrALAZINE, HYDROmorphone, labetalol, lidocaine 4%, lidocaine (buffered or not buffered), LORazepam, magnesium sulfate, magnesium sulfate,  "naloxone, ondansetron **OR** ondansetron, oxyCODONE IR, potassium chloride, potassium chloride with lidocaine, potassium chloride, potassium chloride, potassium chloride, potassium phosphate (KPHOS) in D5W IV, potassium phosphate (KPHOS) in D5W IV, potassium phosphate (KPHOS) in D5W IV, potassium phosphate (KPHOS) in D5W IV, potassium phosphate (KPHOS) in D5W IV, prochlorperazine **OR** prochlorperazine, sodium chloride (PF)    Infusions:    No Known Allergies    Physical Examination:  /54 (BP Location: Right arm)  Pulse 103  Temp 97.8  F (36.6  C) (Axillary)  Resp 16  Ht 1.778 m (5' 10\")  Wt 71.7 kg (158 lb 1.1 oz)  SpO2 98%  BMI 22.68 kg/m2  Physical Examination:  /62  Pulse 103  Temp 98.2  F (36.8  C) (Axillary)  Resp 16  Ht 1.778 m (5' 10\")  Wt 71.7 kg (158 lb 1.1 oz)  SpO2 98%  BMI 22.68 kg/m2  General: NAD, seated in chair   HEENT: Atraumatic, normocephalic.   CV: RRR, no m/r/g. No JVD.   Resp: Symmetric respirations. No use of accessory muscles. CTAB, no wheezes or rhonchi.   Abd: Soft, nontender, nondistended   Skin: No rashes or lesions.  Extremities: No edema  Neurological Examination  Mental status: awake, alert, attentive, unable to answer orientation questions. Spontaneous speech limited to few words/phrases, comprehension intact for most 1 step commands but not all and no 2 step commands. Recent and remote memory intact.  CN: VFF, PERRL, EOMI, face symmetric, facial sensation intact and symmetric, tongue and uvula midline, shoulder shrug intact.  Motor: moves 4/4 extremities antigravity, normal tone, no abnormal movements  Sensory: intact to light touch in 4/4 extremities  Reflexes: 2+ and symmetric in bilateral biceps, brachioradialis, patellae and achilles. No clonus, toes downgoing.  Coordination and gait: FNF and HS without ataxia or dysmetria. Gait not assessed.     Labs/Studies:  Recent Labs   Lab Test  04/19/18   0515  04/19/18   0432  04/17/18   1617   NA   --   140  " 137   POTASSIUM   --   3.8  4.4   CHLORIDE   --   109  104   CO2   --   21  27   ANIONGAP   --   10  7   GLC   --   174*  95   BUN   --   13  16   CR   --   0.78  0.83   RICHARD   --   7.7*  8.3*   WBC  12.4*  13.9*  12.0*   RBC  3.48*  3.55*  4.49   HGB  10.4*  10.7*  13.7   PLT  166  174  196       Recent Labs   Lab Test  04/17/18   1617   INR  0.96     Investigations:  Prelim surgical path: high grade glioma    MRI brain w/ and w/o contrast:  Impression  1.  Findings suggestive of acute left infarct in the left frontal lobe  likely secondary to involvement of the superior division of the left  MCA. Consider MRI/CT angiography to evaluate vasculature. No  hemorrhage seen within the infarct.     2.  Slight increase in edema surrounding the resection cavity and  minimal increase in the left-to-right midline shift. Basal cisterns  appear patent.     3.  Residual nodular enhancement measuring about 1.5 cm seen along the  medial aspect of the resection cavity.    Assessment/Plan  Sarabjit Calvin is a 62 year old h/o HTN and interstitial nephritis admitted 4/18/2018 for resection of newly diagnosed left frontal lobe mass.    Plan  Neuro:  #L frontal lobe mass s/p resection 4/18  POD#1  Prelim path: high grade glioma  -continue decadron 4 mg q6  -neuro checks q1 hour  -post-op MRI completed    Resp:        CV:  #HTN  -PTA on bumex 1 mg BID, metoprolol tartrate 25 TID  -continue bumex  -metoprolol 10 mg IV q8  -SBP goal < 140    #HLD  -continue PTA atorvastatin 10 mg    Renal:  #interstitial nephritis  -continue PTA cyclosporine 50 mg BID    Endo:  -Fingerstick glucoses while on steroids  -hypoglycemia protocol    Heme:  #anemia- post-op  -monitor with daily CBC  -transfuse for Hgb < 7.0    #leukocytosis- post-op and secondary to stress demargination  -daily CBC    GI:  No active issues  -bowel reg    ID:  No active issues    FEN: no mIVF, lytes replacement protocols, DD3, thins  PPX:    DVT prophylaxis: SCDs, no chemoppx     GI: PPI  Code Status: Full    Dispo: Continue ICU level cares    The patient was seen and discussed with the fellow, Dr. Wise, and the attending, Dr. Franks.    Jessie Hardy MD  Neurology PGY-2  p [phone]

## 2018-04-19 NOTE — PLAN OF CARE
Problem: Patient Care Overview  Goal: Plan of Care/Patient Progress Review  Outcome: No Change  Neuro: Pt. Able to follow some commands and answers appropriately to most questions (yes or no). Disoriented to time, situation. Continues to have expressive aphasia and trouble wordfinding. Pupils equal and reactive. R facial droop. Neglect of RUE, right arm movement able to resist at beginning of shift and at 3608-1789 pt. Unable to move right arm against resistance, uses left arm to lift his right arm. MD notified and stopped by, no concern at this time. Impulsive, Tried to get out of bed intermittently throughout shift, bed alarm on and audible.   GI/: MD verified ok to have ice chips/meds despite facial droop. Swallow test cleared and no coughing or delay in swallowing small sips of water but had trouble swallowing large pills with water. Pt. Complained of nausea w/some gagging around midnight. Gave Zofran, pt. Expressed relief.   Vitals: Sinus tach up to 110s. BP hypertensive, labetolol PRN given several times (see EMAR) resulting in minimal to no decrease to BP. Hydralazine PRN given with better blood pressure control. Denies pain.    Plan: serial neuro exams. Formal speech eval today. Continue to monitor, notify MD with any concerns.

## 2018-04-19 NOTE — PROGRESS NOTES
04/19/18 1500   Quick Adds   Type of Visit Initial PT Evaluation       Present no   Living Environment   Lives With alone   Living Arrangements house   Home Accessibility stairs to enter home   Number of Stairs to Enter Home 3  (1 rail)   Number of Stairs Within Home 0   Transportation Available car   Living Environment Comment [place] miles outside of [place], [place], would have limited help closeby   Self-Care   Usual Activity Tolerance good   Current Activity Tolerance fair   Regular Exercise yes   Activity/Exercise Type walking   Exercise Amount/Frequency daily   Equipment Currently Used at Home none   Functional Level Prior   Ambulation 0-->independent   Transferring 0-->independent   Toileting 0-->independent   Bathing 0-->independent   Dressing 0-->independent   Eating 0-->independent   Communication 0-->understands/communicates without difficulty   Swallowing 0-->swallows foods/liquids without difficulty   Cognition 0 - no cognition issues reported   Fall history within last six months no   Prior Functional Level Comment independent with all functional mobility and ADLs, recently retired   General Information   Onset of Illness/Injury or Date of Surgery - Date 04/18/18   Referring Physician Jeremy Guzman MD   Patient/Family Goals Statement unable to state due to aphasia   Pertinent History of Current Problem (include personal factors and/or comorbidities that impact the POC) 62 year old male POD#1 from awake craniotomy for tumor resection.  Neurological status is worse post-operatively likely due to cerebral edema.      General Info Comments appears to have receptive and expressive aphasia   Cognitive Status Examination   Orientation other (see comments)  (unable to assess)   Level of Consciousness alert   Follows Commands and Answers Questions 75% of the time   Personal Safety and Judgment intact   Posture    Posture Forward head position;Protracted shoulders   Range of  "Motion (ROM)   ROM Comment B LE grossly WNL   Strength   Strength Comments L LE grossly 5/5; R LE grossly 4+/5   Transfer Skills   Transfer Comments sit > stand with min assist   Gait   Gait Comments ambulated in room without AD and min assist   Balance   Balance Comments requires min assist to maintain standing balance   Sensory Examination   Sensory Perception Comments unable to accurately assess due to aphasia   Coordination   Coordination Comments impaired coordination R LE   General Therapy Interventions   Planned Therapy Interventions balance training;bed mobility training;gait training;neuromuscular re-education;strengthening;transfer training;progressive activity/exercise   Clinical Impression   Criteria for Skilled Therapeutic Intervention yes, treatment indicated   PT Diagnosis impaired functional mobility s/p brain tumor resection   Influenced by the following impairments aphasia, decreased strength, abnormal sensation, impaired balance   Functional limitations due to impairments impaired bed mobility, impaired transfers, impaired gait   Clinical Presentation Evolving/Changing   Clinical Presentation Rationale clinical judgment,    Clinical Decision Making (Complexity) Low complexity   Therapy Frequency` daily   Predicted Duration of Therapy Intervention (days/wks) 1 week   Anticipated Discharge Disposition Acute Rehabilitation Facility   Risk & Benefits of therapy have been explained Yes   Patient, Family & other staff in agreement with plan of care Yes   [facility]  \"6 Clicks\" V.2 Basic Mobility Inpatient Short Form   1. Turning from your back to your side while in a flat bed without using bedrails? 3 - A Little   2. Moving from lying on your back to sitting on the side of a flat bed without using bedrails? 3 - A Little   3. Moving to and from a bed to a chair (including a wheelchair)? 3 - A Little   4. Standing up from a chair using your arms (e.g., wheelchair, or bedside chair)? 3 - A " Little   5. To walk in hospital room? 3 - A Little   6. Climbing 3-5 steps with a railing? 2 - A Lot   Basic Mobility Raw Score (Score out of 24.Lower scores equate to lower levels of function) 17   Total Evaluation Time   Total Evaluation Time (Minutes) 9

## 2018-04-19 NOTE — PLAN OF CARE
Problem: Patient Care Overview  Goal: Plan of Care/Patient Progress Review  Discharge Planner SLP   Patient plan for discharge: Unknown   Current status: Pt seen bedside for dysphagia evaluation per MD orders.  Aphasia present.  Pt presents with mild oropharyngeal dysphagia at bedside characterized by weakness.  R sided facial, lingual, and labial weakness noted.  Pt exhibited reduced bolus formation/control with anterior loss of liquids x1.  Cough elicited x1 on large sip of thin liquids; no other overt s/s of aspiration observed upon additional trials of thin liquids, pudding, or solid textures.  Recommend dysphagia diet level 3 and thin liquids when alert and upright.  Pt will need to take small bites/sips, pace self, and alternate consistecies.  Hold diet should neurological status change or upon s/s of aspiration.  ST to follow up with dysphagia intervention and speech language evaluation.    Barriers to return to prior living situation: Safety concerns   Recommendations for discharge: Rehab  Rationale for recommendations: Anticipate ongoing needs       Entered by: Jossie Irizarry 04/19/2018 9:52 AM

## 2018-04-19 NOTE — PLAN OF CARE
Problem: Patient Care Overview  Goal: Plan of Care/Patient Progress Review  PT 4A    Discharge Planner PT   Patient plan for discharge: rehab  Current status: min assist for sit <> stand. Patient ambulated 135' with min-mod assist for weight shift to L during gait.  Limited response to verbal cues, good response to tactile cues.  Motivated to work in rehab.   Barriers to return to prior living situation: assistance needed for safe mobility.  Recommendations for discharge: ARU  Rationale for recommendations:patient highly motivated to work in PT,  Independent with all functional mobility prior to admission.        Entered by: Dominick Lopez 04/19/2018 4:58 PM

## 2018-04-19 NOTE — PROGRESS NOTES
04/19/18 0934   General Information   Onset Date 04/18/18   Start of Care Date 04/19/18   Referring Physician Rafael Guzman MD    Patient Profile Review/OT: Additional Occupational Profile Info See Profile for full history and prior level of function   Patient/Family Goals Statement Goals not stated at the time of evaluation.     Swallowing Evaluation Bedside swallow evaluation   Behaviorial Observations Alert;Distractible;Other (Comment)  (Expressive and receptive aphasia present )   Mode of current nutrition NPO   Respiratory Status Room air   Comments Pt is a 62 year old male POD#1 from awake craniotomy for tumor resection.  Neurological status is worse post-operatively likely due to cerebral edema.      Clinical Swallow Evaluation   Oral Musculature anomalies present  (R sided facial weakness )   Structural Abnormalities none present   Dentition present and adequate   Mucosal Quality adequate   Mandibular Strength and Mobility intact   Oral Labial Strength and Mobility impaired retraction;impaired seal;impaired coordination;impaired pursing   Lingual Strength and Mobility impaired coordination   Laryngeal Function Cough;Throat clear;Swallow;Voicing initiated;Dry swallow palpated   Additional Documentation Yes   Clinical Swallow Eval: Thin Liquid Texture Trial   Mode of Presentation, Thin Liquids cup;spoon;straw;self-fed;fed by clinician   Volume of Liquid or Food Presented ice chips x3, sips via cup x2, 3 oz via straw    Oral Phase of Swallow other (see comments)  (Reduced bolus formation/control )   Oral Residue right lip drooling  (x1)   Pharyngeal Phase of Swallow coughing/choking   Diagnostic Statement Cough elicited x1 on large sip via straw.  No other overt s/s of aspiration.     Clinical Swallow Eval: Pudding Thick Liquid Texture Trial   Mode of Presentation, Pudding spoon;fed by clinician   Volume of Pudding Presented 1 tbsp bites x5    Oral Phase, Pudding WFL   Pharyngeal Phase, Pudding intact    Diagnostic Statement Functional swallow for pudding textures.     Clinical Swallow Eval: Solid Food Texture Trial   Mode of Presentation, Solid self-fed   Volume of Solid Food Presented 2 leann crackers    Oral Phase, Solid other (see comments)  (Reduced bolus formation/control, increased)   Pharyngeal Phase, Solid intact   Diagnostic Statement Reduced bolus formation control noted requiring increased time.  Minimal residue remaining after swallow with pt clearing with independence.     Swallow Compensations   Swallow Compensations Multiple swallow;Reduce amounts;Pacing;Alternate viscosity of consistencies   Esophageal Phase of Swallow   Patient reports or presents with symptoms of esophageal dysphagia No   General Therapy Interventions   Planned Therapy Interventions Dysphagia Treatment   Dysphagia treatment Modified diet education;Compensatory strategies for swallowing;Instruction of safe swallow strategies   Swallow Eval: Clinical Impressions   Skilled Criteria for Therapy Intervention Skilled criteria met.  Treatment indicated.   Functional Assessment Scale (FAS) 5   Treatment Diagnosis Mild oropharyngeal    Diet texture recommendations Dysphagia diet level 3;Thin liquids   Recommended Feeding/Eating Techniques alternate between small bites and sips of food/liquid;maintain upright posture during/after eating for 30 mins;check mouth frequently for oral residue/pocketing;small sips/bites   Demonstrates Need for Referral to Another Service occupational therapy;physical therapy;respiratory therapy;dietitian   Therapy Frequency 5 times/wk   Predicted Duration of Therapy Intervention (days/wks) 2 weeks    Anticipated Discharge Disposition inpatient rehabilitation facility   Risks and Benefits of Treatment have been explained. Yes   Patient, family and/or staff in agreement with Plan of Care Yes   Clinical Impression Comments Pt seen bedside for dysphagia evaluation per MD orders.  Aphasia present.  Pt presents with  mild oropharyngeal dysphagia at bedside characterized by weakness.  R sided facial, lingual, and labial weakness noted.  Pt exhibited reduced bolus formation/control with anterior loss of liquids x1.  Cough elicited x1 on large sip of thin liquids; no other overt s/s of aspiration observed upon additional trials of thin liquids, pudding, or solid textures.  Recommend dysphagia diet level 3 and thin liquids when alert and upright.  Pt will need to take small bites/sips, pace self, and alternate consistecies.  Hold diet should neurological status change or upon s/s of aspiration.     Total Evaluation Time   Total Evaluation Time (Minutes) 14

## 2018-04-19 NOTE — PROGRESS NOTES
Neurosurgery Progress Note    Name: Sarabjit Calvin  Age/Sex: 62 M  Rm: [id number]  MRN: [id number]  Staff: Radha  Date of Admission: 4/18  Primary Service: Neurosurgery  Consulting Services: [facility]    HPI: 62 RH M with several week history of speech slurring associated with right handed clumsiness. The patient underwent an MRI that reportedly showed a left frontal lesion. The patient was prescribed Decadron and referred for consideration of craniotomy.    Procedures:  4/18: Stealth Guided Left Craniotomy for Tumor Resection, Awake    Daily events:   4/18: Surgery as above. Mild expressive and receptive aphasia post-op.    Imaging:  Postoperative MRI brain when tolerated    Examination:  AOx2.   CN II - XII intact except for right lower facial droop  Moderate expressive > receptive aphasia, able to follow commands.   Strength 4-/5 in RUE with R arm neglect otherwise intact     Assessment/Plan of care: 62 year old male POD#1 from awake craniotomy for tumor resection.  Neurological status is worse post-operatively likely due to cerebral edema.       NEURO:   - Post-Operative Pain Control  - Neuro Examination Q 1 HR  - Keppra 1000 bid for seizure ppx x 7 days (4/25)  - Decadron for cerebral edema with brain compression   - Post-operative MRI 4/19    CV:  - Goal SBP < 140 mmHg    PULM:   - Incentive Spirometry Q 1 HR     GI/FEN:  - SLP swallow evaluation   - GI Prophylaxis protonix   - Electrolyte Replacement Protocol  - Bowel Regimen w/ Senna and Miralax    RENAL/:   - D/C Driver POD #1  - wean IV fluids as tolerated    HEME/ID:  - Plts > 100k, hgb > 8, INR < 1.5   - DVT Prophylaxis: SCDs to BLE      ENDO:   - No Issues      DISPO:  Anticipate D/C Home  4/20  Barriers: Pending post-op needs and PT/OT evaluation  Activity: Up in Chair TID; Ambulate w/ Assist  PT: Evaluation Pending  OT: Evaluation Pending    -----------------------------------  Kaley Mneon MD, MS  Neurosurgery PGY-1

## 2018-04-19 NOTE — PLAN OF CARE
Problem: Patient Care Overview  Goal: Plan of Care/Patient Progress Review  Neuro: Expressive and receptive aphasia. Pupils equal and reactive. R facial droop. R neglect in arm and foot. Can pivot to chair with one assist.   Respiratory: RA with LS clear.  GI/: Driver pulled at 1530. Dysphagia 3 diet with thin liquids. Good appetite.  VSS, hypertensive at times, afebrile.   Continue to monitor, notify MD with any concerns.

## 2018-04-20 ENCOUNTER — APPOINTMENT (OUTPATIENT)
Dept: PHYSICAL THERAPY | Facility: CLINIC | Age: 63
End: 2018-04-20
Attending: NEUROLOGICAL SURGERY
Payer: COMMERCIAL

## 2018-04-20 ENCOUNTER — APPOINTMENT (OUTPATIENT)
Dept: OCCUPATIONAL THERAPY | Facility: CLINIC | Age: 63
End: 2018-04-20
Attending: STUDENT IN AN ORGANIZED HEALTH CARE EDUCATION/TRAINING PROGRAM
Payer: COMMERCIAL

## 2018-04-20 ENCOUNTER — APPOINTMENT (OUTPATIENT)
Dept: SPEECH THERAPY | Facility: CLINIC | Age: 63
End: 2018-04-20
Attending: NEUROLOGICAL SURGERY
Payer: COMMERCIAL

## 2018-04-20 LAB
ABO + RH BLD: NORMAL
ABO + RH BLD: NORMAL
ANION GAP SERPL CALCULATED.3IONS-SCNC: 7 MMOL/L (ref 3–14)
BLD GP AB SCN SERPL QL: NORMAL
BLOOD BANK CMNT PATIENT-IMP: NORMAL
BUN SERPL-MCNC: 17 MG/DL (ref 7–30)
CALCIUM SERPL-MCNC: 7.6 MG/DL (ref 8.5–10.1)
CHLORIDE SERPL-SCNC: 107 MMOL/L (ref 94–109)
CO2 SERPL-SCNC: 25 MMOL/L (ref 20–32)
CREAT SERPL-MCNC: 0.88 MG/DL (ref 0.66–1.25)
ERYTHROCYTE [DISTWIDTH] IN BLOOD BY AUTOMATED COUNT: 14 % (ref 10–15)
GFR SERPL CREATININE-BSD FRML MDRD: 87 ML/MIN/1.7M2
GLUCOSE SERPL-MCNC: 148 MG/DL (ref 70–99)
HCT VFR BLD AUTO: 30.7 % (ref 40–53)
HGB BLD-MCNC: 10.1 G/DL (ref 13.3–17.7)
MAGNESIUM SERPL-MCNC: 2.4 MG/DL (ref 1.6–2.3)
MCH RBC QN AUTO: 29.9 PG (ref 26.5–33)
MCHC RBC AUTO-ENTMCNC: 32.9 G/DL (ref 31.5–36.5)
MCV RBC AUTO: 91 FL (ref 78–100)
PHOSPHATE SERPL-MCNC: 3.3 MG/DL (ref 2.5–4.5)
PLATELET # BLD AUTO: 164 10E9/L (ref 150–450)
POTASSIUM SERPL-SCNC: 4.1 MMOL/L (ref 3.4–5.3)
RBC # BLD AUTO: 3.38 10E12/L (ref 4.4–5.9)
SODIUM SERPL-SCNC: 139 MMOL/L (ref 133–144)
SPECIMEN EXP DATE BLD: NORMAL
WBC # BLD AUTO: 11.9 10E9/L (ref 4–11)

## 2018-04-20 PROCEDURE — 97116 GAIT TRAINING THERAPY: CPT | Mod: GP | Performed by: PHYSICAL THERAPIST

## 2018-04-20 PROCEDURE — 97112 NEUROMUSCULAR REEDUCATION: CPT | Mod: GP | Performed by: PHYSICAL THERAPIST

## 2018-04-20 PROCEDURE — 86900 BLOOD TYPING SEROLOGIC ABO: CPT | Performed by: STUDENT IN AN ORGANIZED HEALTH CARE EDUCATION/TRAINING PROGRAM

## 2018-04-20 PROCEDURE — 36415 COLL VENOUS BLD VENIPUNCTURE: CPT | Performed by: STUDENT IN AN ORGANIZED HEALTH CARE EDUCATION/TRAINING PROGRAM

## 2018-04-20 PROCEDURE — 25000131 ZZH RX MED GY IP 250 OP 636 PS 637: Performed by: STUDENT IN AN ORGANIZED HEALTH CARE EDUCATION/TRAINING PROGRAM

## 2018-04-20 PROCEDURE — 83735 ASSAY OF MAGNESIUM: CPT | Performed by: NEUROLOGICAL SURGERY

## 2018-04-20 PROCEDURE — 97535 SELF CARE MNGMENT TRAINING: CPT | Mod: GO | Performed by: OCCUPATIONAL THERAPIST

## 2018-04-20 PROCEDURE — 86850 RBC ANTIBODY SCREEN: CPT | Performed by: STUDENT IN AN ORGANIZED HEALTH CARE EDUCATION/TRAINING PROGRAM

## 2018-04-20 PROCEDURE — 40000193 ZZH STATISTIC PT WARD VISIT: Performed by: PHYSICAL THERAPIST

## 2018-04-20 PROCEDURE — 12000008 ZZH R&B INTERMEDIATE UMMC

## 2018-04-20 PROCEDURE — 25000128 H RX IP 250 OP 636

## 2018-04-20 PROCEDURE — 97165 OT EVAL LOW COMPLEX 30 MIN: CPT | Mod: GO | Performed by: OCCUPATIONAL THERAPIST

## 2018-04-20 PROCEDURE — 85027 COMPLETE CBC AUTOMATED: CPT | Performed by: NEUROLOGICAL SURGERY

## 2018-04-20 PROCEDURE — 25000132 ZZH RX MED GY IP 250 OP 250 PS 637: Performed by: STUDENT IN AN ORGANIZED HEALTH CARE EDUCATION/TRAINING PROGRAM

## 2018-04-20 PROCEDURE — 25000125 ZZHC RX 250: Performed by: STUDENT IN AN ORGANIZED HEALTH CARE EDUCATION/TRAINING PROGRAM

## 2018-04-20 PROCEDURE — 25000132 ZZH RX MED GY IP 250 OP 250 PS 637: Performed by: NURSE PRACTITIONER

## 2018-04-20 PROCEDURE — 80048 BASIC METABOLIC PNL TOTAL CA: CPT | Performed by: NEUROLOGICAL SURGERY

## 2018-04-20 PROCEDURE — 86901 BLOOD TYPING SEROLOGIC RH(D): CPT | Performed by: STUDENT IN AN ORGANIZED HEALTH CARE EDUCATION/TRAINING PROGRAM

## 2018-04-20 PROCEDURE — 92526 ORAL FUNCTION THERAPY: CPT | Mod: GN | Performed by: SPEECH-LANGUAGE PATHOLOGIST

## 2018-04-20 PROCEDURE — 84100 ASSAY OF PHOSPHORUS: CPT | Performed by: NEUROLOGICAL SURGERY

## 2018-04-20 PROCEDURE — 25000128 H RX IP 250 OP 636: Performed by: STUDENT IN AN ORGANIZED HEALTH CARE EDUCATION/TRAINING PROGRAM

## 2018-04-20 PROCEDURE — 40000133 ZZH STATISTIC OT WARD VISIT: Performed by: OCCUPATIONAL THERAPIST

## 2018-04-20 PROCEDURE — 92523 SPEECH SOUND LANG COMPREHEN: CPT | Mod: GN | Performed by: SPEECH-LANGUAGE PATHOLOGIST

## 2018-04-20 PROCEDURE — 40000225 ZZH STATISTIC SLP WARD VISIT: Performed by: SPEECH-LANGUAGE PATHOLOGIST

## 2018-04-20 PROCEDURE — 83735 ASSAY OF MAGNESIUM: CPT | Performed by: STUDENT IN AN ORGANIZED HEALTH CARE EDUCATION/TRAINING PROGRAM

## 2018-04-20 RX ORDER — DEXAMETHASONE 1 MG
1 TABLET ORAL EVERY 8 HOURS SCHEDULED
Status: DISCONTINUED | OUTPATIENT
Start: 2018-04-25 | End: 2018-04-23

## 2018-04-20 RX ORDER — DEXAMETHASONE 4 MG/1
4 TABLET ORAL EVERY 8 HOURS SCHEDULED
Status: COMPLETED | OUTPATIENT
Start: 2018-04-21 | End: 2018-04-22

## 2018-04-20 RX ORDER — SODIUM CHLORIDE 9 MG/ML
INJECTION, SOLUTION INTRAVENOUS
Status: COMPLETED
Start: 2018-04-20 | End: 2018-04-20

## 2018-04-20 RX ORDER — DEXAMETHASONE 1 MG
1 TABLET ORAL DAILY
Status: DISCONTINUED | OUTPATIENT
Start: 2018-04-29 | End: 2018-04-23

## 2018-04-20 RX ORDER — DEXAMETHASONE 1 MG
2 TABLET ORAL EVERY 8 HOURS SCHEDULED
Status: DISCONTINUED | OUTPATIENT
Start: 2018-04-23 | End: 2018-04-23

## 2018-04-20 RX ORDER — HYDRALAZINE HYDROCHLORIDE 20 MG/ML
10-20 INJECTION INTRAMUSCULAR; INTRAVENOUS EVERY 30 MIN PRN
Status: DISCONTINUED | OUTPATIENT
Start: 2018-04-20 | End: 2018-04-24 | Stop reason: HOSPADM

## 2018-04-20 RX ORDER — SODIUM CHLORIDE 9 MG/ML
INJECTION, SOLUTION INTRAVENOUS CONTINUOUS
Status: DISCONTINUED | OUTPATIENT
Start: 2018-04-20 | End: 2018-04-21

## 2018-04-20 RX ORDER — LABETALOL HYDROCHLORIDE 5 MG/ML
10-40 INJECTION, SOLUTION INTRAVENOUS EVERY 10 MIN PRN
Status: DISCONTINUED | OUTPATIENT
Start: 2018-04-20 | End: 2018-04-24 | Stop reason: HOSPADM

## 2018-04-20 RX ORDER — DEXAMETHASONE 4 MG/1
4 TABLET ORAL EVERY 6 HOURS
Status: COMPLETED | OUTPATIENT
Start: 2018-04-20 | End: 2018-04-21

## 2018-04-20 RX ORDER — METOPROLOL TARTRATE 25 MG/1
25 TABLET, FILM COATED ORAL 3 TIMES DAILY
Status: DISCONTINUED | OUTPATIENT
Start: 2018-04-20 | End: 2018-04-24 | Stop reason: HOSPADM

## 2018-04-20 RX ORDER — DEXAMETHASONE 1 MG
1 TABLET ORAL EVERY 12 HOURS SCHEDULED
Status: DISCONTINUED | OUTPATIENT
Start: 2018-04-27 | End: 2018-04-23

## 2018-04-20 RX ADMIN — ATORVASTATIN CALCIUM 10 MG: 10 TABLET, FILM COATED ORAL at 08:44

## 2018-04-20 RX ADMIN — PANTOPRAZOLE SODIUM 40 MG: 40 TABLET, DELAYED RELEASE ORAL at 08:44

## 2018-04-20 RX ADMIN — METOPROLOL TARTRATE 10 MG: 5 INJECTION INTRAVENOUS at 08:44

## 2018-04-20 RX ADMIN — DEXAMETHASONE SODIUM PHOSPHATE 4 MG: 10 INJECTION, SOLUTION INTRAMUSCULAR; INTRAVENOUS at 12:27

## 2018-04-20 RX ADMIN — BUMETANIDE 1 MG: 1 TABLET ORAL at 08:44

## 2018-04-20 RX ADMIN — METOPROLOL TARTRATE 25 MG: 25 TABLET ORAL at 20:04

## 2018-04-20 RX ADMIN — METOPROLOL TARTRATE 25 MG: 25 TABLET ORAL at 15:07

## 2018-04-20 RX ADMIN — DEXAMETHASONE 4 MG: 4 TABLET ORAL at 18:38

## 2018-04-20 RX ADMIN — POLYETHYLENE GLYCOL 3350 17 G: 17 POWDER, FOR SOLUTION ORAL at 08:44

## 2018-04-20 RX ADMIN — SENNOSIDES AND DOCUSATE SODIUM 1 TABLET: 8.6; 5 TABLET ORAL at 08:43

## 2018-04-20 RX ADMIN — ACETAMINOPHEN 975 MG: 325 TABLET, FILM COATED ORAL at 08:44

## 2018-04-20 RX ADMIN — LEVETIRACETAM 750 MG: 750 TABLET, FILM COATED ORAL at 20:04

## 2018-04-20 RX ADMIN — ACETAMINOPHEN 975 MG: 325 TABLET, FILM COATED ORAL at 17:38

## 2018-04-20 RX ADMIN — BUMETANIDE 1 MG: 1 TABLET ORAL at 20:04

## 2018-04-20 RX ADMIN — SODIUM CHLORIDE: 9 INJECTION, SOLUTION INTRAVENOUS at 08:45

## 2018-04-20 RX ADMIN — CYCLOSPORINE 50 MG: 25 CAPSULE, LIQUID FILLED ORAL at 17:47

## 2018-04-20 RX ADMIN — LEVETIRACETAM 750 MG: 750 TABLET, FILM COATED ORAL at 08:44

## 2018-04-20 RX ADMIN — DEXAMETHASONE SODIUM PHOSPHATE 4 MG: 10 INJECTION, SOLUTION INTRAMUSCULAR; INTRAVENOUS at 06:27

## 2018-04-20 RX ADMIN — CYCLOSPORINE 50 MG: 25 CAPSULE, LIQUID FILLED ORAL at 08:44

## 2018-04-20 ASSESSMENT — VISUAL ACUITY
OU: NORMAL ACUITY

## 2018-04-20 NOTE — PROGRESS NOTES
04/20/18 0902   Quick Adds   Type of Visit Initial Occupational Therapy Evaluation   Living Environment   Lives With alone   Living Arrangements house   Home Accessibility stairs to enter home;tub/shower is not walk in;grab bars present (bathtub);grab bars present (toilet)   Number of Stairs to Enter Home 3   Number of Stairs Within Home 0   Transportation Available car   Living Environment Comment Pt lives alone, sister lives half hour away   Self-Care   Dominant Hand right   Usual Activity Tolerance good   Current Activity Tolerance moderate   Regular Exercise yes   Activity/Exercise Type walking   Exercise Amount/Frequency daily   Equipment Currently Used at Home grab bar   Functional Level Prior   Ambulation 0-->independent   Transferring 0-->independent   Toileting 0-->independent   Bathing 0-->independent   Dressing 0-->independent   Eating 0-->independent   Communication 0-->understands/communicates without difficulty   Swallowing 0-->swallows foods/liquids without difficulty   Cognition 0 - no cognition issues reported   Fall history within last six months no   Which of the above functional risks had a recent onset or change? none   General Information   Onset of Illness/Injury or Date of Surgery - Date 04/18/18   Referring Physician Jeremy Guzman MD   Patient/Family Goals Statement To get home and be Ind   Additional Occupational Profile Info/Pertinent History of Current Problem Sarabjit Calvin is a 62 year old h/o HTN and interstitial nephritis admitted 4/18/2018 for resection of newly diagnosed left frontal lobe mass.   Precautions/Limitations fall precautions   Cognitive Status Examination   Orientation person  (Pt unable to answer open ended questions)   Level of Consciousness alert   Able to Follow Commands moderate impairment   Personal Safety (Cognitive) mild impairment   Cognitive Comment Pt with expressive aphasia  (could say  yes/no to correct date and place when given list )   Visual  Perception   Visual Perception Comments appeared WNL, difficulty following commands   Sensory Examination   Sensory Quick Adds No deficits were identified   Pain Assessment   Patient Currently in Pain No   Integumentary/Edema   Integumentary/Edema no deficits were identifed   Range of Motion (ROM)   ROM Comment R UE shoulder WFL, distal no AROM   Strength   Strength Comments R shoulder 2+/5, tri/biceps 2-/5, distal 0/5   Muscle Tone Assessment   Muscle Tone Comments appears WNL   Coordination   Upper Extremity Coordination Right UE impaired   Coordination Comments disuse of R UE, pt does not neglect side   Mobility   Bed Mobility Comments Min A   Transfer Skill: Sit to Stand   Level of [place]: Sit/Stand contact guard   Upper Body Dressing   Level of [place]: Dress Upper Body maximum assist (25% patients effort)   Lower Body Dressing   Level of [place]: Dress Lower Body maximum assist (25% patients effort)   Toileting   Level of [place]: Toilet maximum assist (25% patients effort)   Grooming   Level of [place]: Grooming minimum assist (75% patients effort)   Instrumental Activities of Daily Living (IADL)   Previous Responsibilities (Pt was Ind)   Activities of Daily Living Analysis   Impairments Contributing to Impaired Activities of Daily Living motor control impaired;post surgical precautions;coordination impaired;strength decreased  (expressive and receptive aphasia )   General Therapy Interventions   Planned Therapy Interventions ADL retraining;home program guidelines;progressive activity/exercise;neuromuscular re-education;cognition   Clinical Impression   Criteria for Skilled Therapeutic Interventions Met yes, treatment indicated   OT Diagnosis decreased ADL I   Influenced by the following impairments expressive/receptive aphasia, R UE motor control, crani precautions   Assessment of Occupational Performance 5 or more Performance Deficits   Identified Performance Deficits leisure, home  "mgmt, dressing, bathing, toileting   Clinical Decision Making (Complexity) Moderate complexity   Therapy Frequency daily   Predicted Duration of Therapy Intervention (days/wks) 5/4/18   Anticipated Equipment Needs at Discharge (TBD)   Anticipated Discharge Disposition Acute Rehabilitation Facility   Risks and Benefits of Treatment have been explained. Yes   Patient, Family & other staff in agreement with plan of care Yes   Clinical Impression Comments Pt would benefit from skilled OT to help increase ADL I after craniotomy   [facility] TM \"6 Clicks\"   2016, Trustees of [facility], under license to [employer].  All rights reserved.   6 Clicks Short Forms Daily Activity Inpatient Short Form   [facility] AM-PAC  \"6 Clicks\" Daily Activity Inpatient Short Form   1. Putting on and taking off regular lower body clothing? 2 - A Lot   2. Bathing (including washing, rinsing, drying)? 2 - A Lot   3. Toileting, which includes using toilet, bedpan or urinal? 2 - A Lot   4. Putting on and taking off regular upper body clothing? 2 - A Lot   5. Taking care of personal grooming such as brushing teeth? 3 - A Little   6. Eating meals? 3 - A Little   Daily Activity Raw Score (Score out of 24.Lower scores equate to lower levels of function) 14   Total Evaluation Time   Total Evaluation Time (Minutes) 10     "

## 2018-04-20 NOTE — PROGRESS NOTES
Alerted to patient's room by a loud noise around 0940 and found patient to have fallen while trying to ambulate to the bedside commode. Neurological exam performed once patient returned to bed with neuros at baseline. Patient had previously been seated into the recliner by the occupational therapist and had been in the chair for approximately 5 minutes before fall had happened. No chair alarm was in place at the time of the fall. Neurosurgical team notified and spoken to post fall and was informed that they would round on the patient shortly. Will continue to monitor for safety and comfort.    Jose Carlos Farris RN  4/20/2018  10:03 AM

## 2018-04-20 NOTE — PLAN OF CARE
Problem: Patient Care Overview  Goal: Plan of Care/Patient Progress Review  Discharge Planner SLP   Patient plan for discharge: ARU   Current status: Pt seen bedside for formal speech language evaluation as well as dysphagia f/u.  Pt presents with severe receptive and expressive language deficits with relative strength in receptive skills.  Pt follow single step commands with 80% accuracy, identified common objects, and accurately responsed to yes/no questions.  Verbal expression skills limited to single word productions and wrote phrases.  Pt was unable to state name, state automatics or name basic objects.  Pt benefitted from clinican model to increase accuracy.  Pt is aware of deficits, frustrated by deficits, and appropriately emotional following evaluation.  Pt continues to be appropriate for dysphagia diet level 3 and thin liquids when alert and upright.  Pt will need to take small bites/sips, pace self, alternate consistencies, and check for residue.    Barriers to return to prior living situation: Safety concerns, limited language   Recommendations for discharge: ARU   Rationale for recommendations: Anticipate ongoing needs for language and dysphagia        Entered by: Jossie Irizarry 04/20/2018 8:16 AM

## 2018-04-20 NOTE — PLAN OF CARE
Problem: Patient Care Overview  Goal: Plan of Care/Patient Progress Review  Patient is POD 2 awake craniotomy for tumor resection. VSS. A&Ox3   disoriented to specific month, but knows year. Neuros include expressive and some receptive aphasia, slurred/garbled speech, slight right facial droop at rest, RUE = 2/5 & no fine motor movement, and generalized weakness. Left crani incision is intact with sutures and dermabond, MYRIAM. MRI on 4/19 showed cerebral edema, on decadron. DD3 with thins. PIV @ 100mL/hr. Up with assist of 2, GB, and walker. Voiding spontaneously. Had several BMs this morning. Pain controlled with scheduled Tylenol. Had a fall without injury this morning on 4A   keep on chair and bed alarm at all times. Will continue to monitor.

## 2018-04-20 NOTE — PLAN OF CARE
Problem: Patient Care Overview  Goal: Plan of Care/Patient Progress Review  PT 6A    Discharge Planner PT   Patient plan for discharge: rehab  Current status: transferred to EOB with SBA.  Ambulated 450' but requires nearly constant min-mod assist for adequate L weight shift.  Balance and gait improved from previous day's session but still requires min-mod assist to prevent significant LOB with standing activities.   Barriers to return to prior living situation: assistance needed for safe mobility  Recommendations for discharge: ARU  Rationale for recommendations: dependence with functional mobility.        Entered by: Dominick Lopez 04/20/2018 5:33 PM

## 2018-04-20 NOTE — OP NOTE
Name:  Sarabjit Calvin  MRN:  [id number]  YOB: 1955  Date of Surgery:  April 20, 2018      Pre-operative Diagnosis: left pre-motor tumor  Post-operative Diagnosis: left pre-motor tumor  Procedure:   1) Left craniotomy for tumor resection, awake  2) Electrocorticography    Anesthesia: Proposal sedation    Surgeon: Miky Christie MD, PhD  Assistant: Edmundo Garza MD, Vickey Grace MD    Description of Procedure: After pre-operative laboratory value and informed consent were verified, the patient was brought into the operating room. Antibiotic was administered.    The patient was placed in a supine position, with the head turned to the right, exposing the left temporal cranium. The patient underwent propofol sedation. The head-frame pin sites were anesthetized with 0.5% lidocaine with dilute epinephrine. The patient was pinned. The [employer] reference frame was placed. The patient's anatomy was registered relative to the pre-operative MRI using the [employer] system.    The region overlying the tumor was identified. An incision was planned based on the location of the tumor. The area encompassing the surgical incision was prepped and draped in a sterile manner.     Time out was performed. Hemostasis was achieved using a combination of cautery and Zay clips. The incision was retracted using a cerebellar retractor.  The region overlying the tumor was confirmed using the Stealth probe.    Bryanna holes were placed at the edge of the bony exposure and connected into a craniotomy.  Dural bleeding was controlled. The epidural space was packed withFloseal.     The dura was opened in a c-shaped manner, with the flap pedicle in the inferior temporal margin. A region of abnormality was identified.  The patient was awaken at this point and was able to count from one to ten and name the days of the week. He was moving his arms and leg well. The [place]ann stimulator was used to stimulate the frontal aspect  of the tumor to ensure lack of function cortex. Corticectomy was performed in the region immediate superficial to the lesion. The tumor specimen was immediately apparent. A biopsy was taken and sent to pathology.     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    The plane between the tumor capsule and the normal cerebrum was identified in the superior and inferior margin. These planes were developed. Because the inferior aspect of the tumor abuts the Sylvian vessels, a thin rim of tumor was left in the inferior aspect. The dissection was carried posterior until the motor strip. The tumor did not appear to violate the pial border of the central sulcus. The tumor was removed en bloc after these margins were defined. Meticulous hemostasis was achieved after the tumor resection. The resection cavity was overlaid with surgicel.    After hemostasis, I turned my attention to the closure.  The craniotomy flap was secured using titanium plate/screw construct. The wound was amply irrigated with bacitracin containing saline. The galea was closed with 2'0 vicryl. The skin was closed with 3'0 Monocryl. Dermabond was applied.    I was present and performed the key portions of the procedure.    EBL: 5 cc's    Specimens: Tumor specimens.    Disposition: ICU

## 2018-04-20 NOTE — PROGRESS NOTES
04/20/18 0800   General Information   Type of Visit Initial   Patient Profile Review yes   Onset of Illness/Injury 04/18/18   Start of care date 04/20/18   Referring Physician Rafael Guzman MD    Patient/Family Goals Statement Goals not stated secondary to aphasia.    General Observations Alert    Pertinent History Of Current Problem Pt is a 62 year old male POD#2 from awake craniotomy for tumor resection.  Neurological status is stable.     Precautions/Limitations: Hearing WFL   Precautions/Limitations: Vision WFL   Cognitive Status Examination   Behavioral Observations alert   Cognitive Linguistic Status Examination   Diffuse Language Characteristics perseveration   Auditory Comprehension   Quick Adds Yes   Auditory comprehension comments Pt aware of errors and frustrated.  Pt benefitted from clinician modeling.     SLP Auditory Comprehension   Auditory Comprehension impaired   Able To Identify Objects field of 4 or more  (80% accurate )   Able To Identify Pictures field of 4 or more  (100% accurate )   Answers Yes/No Questions complex questions successful;basic questions successful;personal questions successful;concrete questions successful;closed questions successful;WNL/WFL   Able To Follow Commands 1-step commands  (scored 6/8 )   Able To Follow Commands 2-step commands  (scored 1/4 )   Successful Auditory Strategies repetition;verbal cues   Verbal Expression   Quick Adds Yes   Verbal expression comments Spontaneous productions limited to single words: 'umm hmm,' 'ok,' 'yeah' and short wrote phrases: 'I guess,' 'I dont know'    SLP Verbal Expression   Verbal Expression impaired   Automatic Speech severe impairment;other (see comments)  (Counted to 10 with initation cue; added 'or' between words )   Repetition word   Fluency nonfluent   Verbal Strategies unison speech   Confrontation Naming Scored 0/5   General Therapy Interventions   Planned Therapy Interventions Language   Language Verbal expression;Written  expression   Clinical Impression   Criteria for skilled therapeutic interventions met Yes   SLP diagnosis Severe expressive and receptive deficits    Functional limitations due to impairments Limited ability to understand complex information; limited ability to express basic wants and needs    Rehab potential Good, to achieve stated therapy goals   Rehab potential affected by Motivation (highly motivated)    Therapy frequency Daily   Predicated duration of therapy intervention (days/wks) 6 weeks   Anticipated discharge disposition inpatient rehabilitation facility   Risks and benefits have been explained Yes   Patient, family and other staff in agreement with plan of care Yes   Clinical Impressions comment  Pt seen bedside for formal speech language evaluation.  Pt presents with severe receptive and expressive language deficits with relative strength in receptive skills.  Pt follow single step commands with 80% accuracy, identified common objects, and accurately responsed to yes/no questions.  Verbal expression skills limited to single word productions and wrote phrases.  Pt was unable to state name, state automatics or name basic objects.  Pt benefitted from clinican model to increase accuracy.  Pt is aware of deficits, frustrated by deficits, and appropriately emotional following evaluation.  Pt would benefit from intensive language intervention; ST to follow as indicated on POC.     Total Evaluation Time   Total Evaluation Time (Minutes) 15

## 2018-04-20 NOTE — PROGRESS NOTES
Social Work: Assessment with Discharge Plan    Patient Name:  Sarabjit Calvin  :  1955  Age:  62 year old  MRN:  [id number]  Risk/Complexity Score:  Filed Complexity Screen Score: 4  Completed assessment with:  Pt, sister, niece    Presenting Information   Reason for Referral:  Discharge plan  Date of Intake:  2018  Referral Source:  SW Auto Case Finding  Decision Maker:  Pt, at baseline  Alternate Decision Maker:  Per Pt's HCD, his sister, Sushma, is his primary agent.  Health Care Directive:  Provided Copy; Pt's family brought copies of his HCD, living will, and PHI disclosure form. Writer faxed copies to HIM and placed a hard copy on Pt's chart.  Living Situation:  House; Pt lives alone in a house with 3 steps to enter.   Previous Functional Status:  Independent; Pt had been independent with self-care and mobility. He is recently retired and was able to drive himself.  Patient and family understanding of hospitalization:  Pt and family are aware of Pt's admission for neurosurgery.   Cultural/Language/Spiritual Considerations:  Per chart, Pt identifies as [place] and his primary language is English.  Adjustment to Illness:  Pt appears to be in good spirits and is motivated to move on to ARU. He does have some aphasia, but is able to communicate.    Physical Health  Reason for Admission:  No diagnosis found.  Services Needed/Recommended:  ARU    Support System  Significant relationship at present time:  Sister, niece  Family of origin is available for support:  Pt's sister lives near Pt in [place], [place].   Other support available:  Pt's niece lives in the [place] area.  Gaps in support system:  Pt lives alone.  Patient is caregiver to:  None     Provider Information   Primary Care Physician:  System, Provider Not In   None   Clinic:         :  None    Financial   Income Source:  Retired  Financial Concerns:  None noted  Insurance:    Payor/Plan Subscriber Name Rel Member # Group #    BCBS - BCBS OUT OF [facility] KAVITHA [id number]       [id number]       Discharge Plan   Patient and family discharge goal:  Pt and family are hoping that Pt will return to baseline function. They are agreeable to planning for ARU in [place].  Provided education on discharge plan:  YES  Patient agreeable to discharge plan:  YES  A list of Medicare Certified Facilities was provided to the patient and/or family to encourage patient choice. Patient's choices for facility are:  A list of ARU facilities closest to Pt was provided. Pt and family prefer [facility] in [place], [place], which is the only ARU within 100 miles of Pt's home.  Will NH provide Skilled rehabilitation or complex medical:  Pt will go to ARU, not NH.  General information regarding anticipated insurance coverage and possible out of pocket cost was discussed. Patient and patient's family are aware patient may incur the cost of transportation to the facility, pending insurance payment: YES  Barriers to discharge:  Medical clearance, acceptance and insurance auth at ARU.    Discharge Recommendations   Anticipated Disposition:  Facility:  [facility]  Transportation Needs:  Family:  If Pt is stable for private vehicle transport, family will likely provide transportation.  Name of Transportation Company and Phone:  N/A    Additional comments   SW met with Pt and family to confirm decision-maker and discuss discharge planning. Pt's family provided copies of Pt's HCD's. Pt's sister is his primary agent, however he lists his niece, Jessie, as his emergency contact as she lives in the [place] area. Pt and family informed Writer that ARU is recommended and that they prefer to have Pt return to [place] for rehab. Writer provided a list of ARU's closest to Pt's home and he chose [facility] in [place]. Writer offered to fax a referral and explained that Pt may be able to transfer as soon as Monday, pending medical clearance, acceptance  at the facility, and insurance authorization. Writer also explained that insurance does not cover transportation. If Pt is medically stable for transport via private vehicle, his family may be able to provide transport. Writer [facility] ([phone]) and faxed a referral to her at [phone]. SW will remain available for ongoing discharge planning and support.      Gwen Kramer, [facility]  ICU   Pager: [phone]

## 2018-04-20 NOTE — PLAN OF CARE
Problem: Patient Care Overview  Goal: Plan of Care/Patient Progress Review  Discharge Planner OT   Patient plan for discharge: pt unable to verbalize  Current status: Pt with expressive and receptive aphasia, follows most commands. Pt is aware of R UE, but is unable to use during functional activity 2/2 motor control, MMT 0/5 distally, 2-/5 bicep/tricep, 2+/5 general shoulder. Max A for LE dressing, Min A for g/h tasks, bed tx and standing tx.   Barriers to return to prior living situation: R UE disuse, aphasia, crani precautions, pt was living alone with sister to A living 30 mins away.  Recommendations for discharge: ARU  Rationale for recommendations: to maximize ADL I and safety.        Entered by: Robby Krishnan 04/20/2018 10:11 AM

## 2018-04-20 NOTE — PROGRESS NOTES
Neurosurgery Progress Note    Name: Sarabjit Calvin  Age/Sex: 62 M  Rm: [id number]  MRN: [id number]  Staff: Radha  Date of Admission:   Primary Service: Neurosurgery  Consulting Services: [facility]    HPI: 62 RH M with several week history of speech slurring associated with right handed clumsiness. The patient underwent an MRI that reportedly showed a left frontal lesion. The patient was prescribed Decadron and referred for consideration of craniotomy.    Procedures:  : Stealth Guided Left Craniotomy for Tumor Resection, Awake    Daily events:   : Surgery as above. Mild expressive and receptive aphasia post-op.  : MRI Brain Completed; Speech Evaluation Completed: Dysphagia Diet Level 3 w/ Thin Liquids    Imagin/19: MRI Brain: Edema surrounding resection cavity; Restricted diffusion in Left M2 Territory    Examination:  AOx2.   CN II - XII intact except for right lower facial droop  Moderate expressive > receptive aphasia, able to follow commands.   Strength 4-/5 in RUE with R arm neglect otherwise intact     Assessment/Plan of care: 62 year old male POD#2 from awake craniotomy for tumor resection.  Neurological status is stable.      NEURO:   - Post-Operative Pain Control  - Neuro Examination Q 1 HR  - Keppra 1000 bid for seizure ppx x 7 days ()  - Decadron for cerebral edema with brain compression   - F/U Surgical Pathology  - Added to Brain Tumor Board (Will discuss on )    CV:  - Goal SBP < 140 mmHg    PULM:   - Incentive Spirometry Q 1 HR     GI/FEN:  - Dysphagia: SLP Evaluation Completed; On Dysphagia Diet Level 3 w/ Thin Liquids   - GI Prophylaxis: Protonix 40 mg QD   - Electrolyte Replacement Protocol  - Bowel Regimen w/ Senna and Miralax    RENAL/:   - D/C Driver POD #1  - Discontinue IV fluids once adequate PO intake     HEME/ID:  - Plts > 100k, hgb > 8, INR < 1.5   - DVT Prophylaxis: SCDs to BLE      ENDO:   -Insulin Sliding Scale with concurrent use of Dexamethasone      DISPO:   Likely ARU; Anticipate Readiness for D/C 4/22  Barriers: Pending post-op needs and PT/OT evaluation  Activity: Up in Chair TID; Ambulate w/ Assist  PT: Evaluation Pending  OT: Evaluation Pending  SLP: ARU    -----------------------------------  Kaley Menno MD, MS  Neurosurgery PGY-1

## 2018-04-20 NOTE — PLAN OF CARE
Problem: Craniotomy/Craniectomy/Cranioplasty (Adult)  Goal: Signs and Symptoms of Listed Potential Problems Will be Absent, Minimized or Managed (Craniotomy/Craniectomy/Cranioplasty)  Signs and symptoms of listed potential problems will be absent, minimized or managed by discharge/transition of care (reference Craniotomy/Craniectomy/Cranioplasty (Adult) CPG).   Outcome: Improving  Pt has no acute changes in neurological exam. He is using his call light appropriately.     Comments: NEURO: No acute changes, pupils 3-4 mm equal/reactive; EOMs intact; R sided facial droop at rest/ R sided neglect (neuro aware); expressive aphasia; see flowsheets for further assessments/details  CV: SR-ST 50s-100s; (neurosurg aware bradycardia/peaked t waves) SBP WDL-goal ; trace edema to lower extrems;   RESP: WNL  GI:disphagia 3; thin liquids; took meds well w/ applesauce; +BM this AM  : continent; adequate UOP; voiding w/o difficulty  SKIN:L head incision CDI; petechiae to lower legs unchanged  PAIN:denies; no nonverbal sgns/stx  L/D:L PIV W/ TKO  GTTS:NA     I/A: stable; able to use call light appropriately, no acute changes overnight     P:Continue to monitor and notify MD of any acute changes. Goal  ? trx to floor today    A Olman AVENDANO

## 2018-04-21 ENCOUNTER — APPOINTMENT (OUTPATIENT)
Dept: SPEECH THERAPY | Facility: CLINIC | Age: 63
End: 2018-04-21
Attending: NEUROLOGICAL SURGERY
Payer: COMMERCIAL

## 2018-04-21 ENCOUNTER — APPOINTMENT (OUTPATIENT)
Dept: PHYSICAL THERAPY | Facility: CLINIC | Age: 63
End: 2018-04-21
Attending: NEUROLOGICAL SURGERY
Payer: COMMERCIAL

## 2018-04-21 PROCEDURE — 97116 GAIT TRAINING THERAPY: CPT | Mod: GP

## 2018-04-21 PROCEDURE — 92526 ORAL FUNCTION THERAPY: CPT | Mod: GN

## 2018-04-21 PROCEDURE — 40000193 ZZH STATISTIC PT WARD VISIT

## 2018-04-21 PROCEDURE — 25000132 ZZH RX MED GY IP 250 OP 250 PS 637: Performed by: STUDENT IN AN ORGANIZED HEALTH CARE EDUCATION/TRAINING PROGRAM

## 2018-04-21 PROCEDURE — 97112 NEUROMUSCULAR REEDUCATION: CPT | Mod: GP

## 2018-04-21 PROCEDURE — 92507 TX SP LANG VOICE COMM INDIV: CPT | Mod: GN

## 2018-04-21 PROCEDURE — 25000132 ZZH RX MED GY IP 250 OP 250 PS 637: Performed by: NURSE PRACTITIONER

## 2018-04-21 PROCEDURE — 40000225 ZZH STATISTIC SLP WARD VISIT

## 2018-04-21 PROCEDURE — 97110 THERAPEUTIC EXERCISES: CPT | Mod: GP

## 2018-04-21 PROCEDURE — 12000008 ZZH R&B INTERMEDIATE UMMC

## 2018-04-21 PROCEDURE — 25000131 ZZH RX MED GY IP 250 OP 636 PS 637: Performed by: STUDENT IN AN ORGANIZED HEALTH CARE EDUCATION/TRAINING PROGRAM

## 2018-04-21 RX ADMIN — CYCLOSPORINE 50 MG: 25 CAPSULE, LIQUID FILLED ORAL at 17:56

## 2018-04-21 RX ADMIN — DEXAMETHASONE 4 MG: 4 TABLET ORAL at 06:27

## 2018-04-21 RX ADMIN — LEVETIRACETAM 750 MG: 750 TABLET, FILM COATED ORAL at 20:20

## 2018-04-21 RX ADMIN — DEXAMETHASONE 4 MG: 4 TABLET ORAL at 14:14

## 2018-04-21 RX ADMIN — METOPROLOL TARTRATE 25 MG: 25 TABLET ORAL at 08:55

## 2018-04-21 RX ADMIN — SENNOSIDES AND DOCUSATE SODIUM 3 TABLET: 8.6; 5 TABLET ORAL at 08:55

## 2018-04-21 RX ADMIN — PANTOPRAZOLE SODIUM 40 MG: 40 TABLET, DELAYED RELEASE ORAL at 08:55

## 2018-04-21 RX ADMIN — BUMETANIDE 1 MG: 1 TABLET ORAL at 08:54

## 2018-04-21 RX ADMIN — METOPROLOL TARTRATE 25 MG: 25 TABLET ORAL at 20:21

## 2018-04-21 RX ADMIN — DEXAMETHASONE 4 MG: 4 TABLET ORAL at 23:10

## 2018-04-21 RX ADMIN — DEXAMETHASONE 4 MG: 4 TABLET ORAL at 00:58

## 2018-04-21 RX ADMIN — CYCLOSPORINE 50 MG: 25 CAPSULE, LIQUID FILLED ORAL at 08:55

## 2018-04-21 RX ADMIN — BUMETANIDE 1 MG: 1 TABLET ORAL at 20:21

## 2018-04-21 RX ADMIN — ACETAMINOPHEN 975 MG: 325 TABLET, FILM COATED ORAL at 00:58

## 2018-04-21 RX ADMIN — ACETAMINOPHEN 975 MG: 325 TABLET, FILM COATED ORAL at 08:54

## 2018-04-21 RX ADMIN — METOPROLOL TARTRATE 25 MG: 25 TABLET ORAL at 14:13

## 2018-04-21 RX ADMIN — ATORVASTATIN CALCIUM 10 MG: 10 TABLET, FILM COATED ORAL at 08:54

## 2018-04-21 RX ADMIN — LEVETIRACETAM 750 MG: 750 TABLET, FILM COATED ORAL at 08:55

## 2018-04-21 ASSESSMENT — VISUAL ACUITY
OU: NORMAL ACUITY

## 2018-04-21 NOTE — PROGRESS NOTES
Neurosurgery Progress Note     Name: Sarabjit Calvin  Age/Sex: 62 M  Rm: [id number]  MRN: [id number]  Staff: Radha  Date of Admission:   Primary Service: Neurosurgery    HPI: 62 RH M with several week history of speech slurring associated with right handed clumsiness. The patient underwent an MRI that reportedly showed a left frontal lesion. The patient was prescribed Decadron and referred for consideration of craniotomy.    Procedures:  : Stealth Guided Left Craniotomy for Tumor Resection, Awake    Daily events:   : Surgery as above. Mild expressive and receptive aphasia post-op.  : MRI Brain Completed; Speech Evaluation Completed: Dysphagia Diet Level 3 w/ Thin Liquids  : Had Fall in Room when attempting to get up independently; Transferred to Unit 6A    Imagin/19: MRI Brain: Edema surrounding resection cavity; Restricted diffusion in Left M2 Territory    Examination:  AOx2.   CN II - XII intact except for right lower facial droop  Moderate expressive > receptive aphasia, able to follow commands.   Strength 4-/5 in RUE with R arm neglect otherwise intact     Assessment/Plan of care: 62 year old male POD#3 from awake craniotomy for tumor resection.  Neurological status is stable.      NEURO:   - Post-Operative Pain Control  - Neuro Examination Q 4 HR  - Keppra 1000 bid for seizure ppx x 7 days ()  - Decadron for cerebral edema with brain compression   - F/U Surgical Pathology  - Added to Brain Tumor Board (Will discuss on )    CV:  - Goal SBP < 160 mmHg    PULM:   - Incentive Spirometry Q 1 HR     GI/FEN:  - Dysphagia: SLP Evaluation Completed; On Dysphagia Diet Level 3 w/ Thin Liquids   - GI Prophylaxis: Protonix 40 mg QD   - Electrolyte Replacement Protocol  - Bowel Regimen w/ Senna and Miralax    RENAL/:   - Voiding  - Dc'd mIVF     HEME/ID:  - Plts > 100k, hgb > 8, INR < 1.5   - DVT Prophylaxis: SCDs to BLE      ENDO:   -Insulin Sliding Scale with concurrent use of  Dexamethasone     DISPO:  Likely ARU; Anticipate Readiness for D/C 4/22  Barriers: Pending post-op needs and PT evaluation  Activity: Up in Chair TID; Ambulate w/ Assist  PT: Evaluation Pending  OT: ARU  SLP: ANGEL Robbins MD  Neurosurgery PGY3

## 2018-04-21 NOTE — PLAN OF CARE
Problem: Patient Care Overview  Goal: Plan of Care/Patient Progress Review  A VSS.  Neuros include: disoriented to time x 2, disoriented to situation x 1, when oriented is usually with choices, expressive and possibly some receptive aphasia, slurred/garbled speech, slight right facial droop at rest, RUE 3/5 with gross movement/no fine motor or hand /generalized weakness, moderate right DP flexions, RLE 4/5, RUE finger to nose ataxia/ LUE deliberate, BLE  heel to shin ataxia, some difficulty tracking, right shoulder shrug weaker, right drift.  Left crani incision is intact with sutures and dermabond, MYRIAM.  Tolerated DD3 with thin liquid meal with some feeding assist and instruction.  PIV @ 100mL/hr.  Up with assist of 2 and GB to bathroom.  Was up in halls with PT early shift.  Up in chair for meal.  Voided spontaneously, post void scan 44 ml.  Had BMs this morning.  Pain controlled with scheduled Tylenol.  Used call light appropriately/chair and bed alarm at all times.  Continue to monitor as ordered and follow POC.

## 2018-04-21 NOTE — PLAN OF CARE
Problem: Patient Care Overview  Goal: Plan of Care/Patient Progress Review  Outcome: No Change  VSS. Alert, orientation waxes and wanes 2-3. Neuros include slurred/garbled speech, slight R facial droop, some difficulty tracking, RUE 3/5 w/gross movement/no fine motor or hand , generalized weakness, and moderate R DP flexions. L crani incision intact w/ sutures and dermabond. Pain controlled w/scheduled tylenol. On DD3 w/thin liquids. PIV infusing at 100ml/hr. Up w/1-2 and GB to bathroom. Voids spontaneously without difficulty and call appropriately. Continue to monitor and follow POC.

## 2018-04-21 NOTE — PLAN OF CARE
Problem: Craniotomy/Craniectomy/Cranioplasty (Adult)  Goal: Signs and Symptoms of Listed Potential Problems Will be Absent, Minimized or Managed (Craniotomy/Craniectomy/Cranioplasty)  Signs and symptoms of listed potential problems will be absent, minimized or managed by discharge/transition of care (reference Craniotomy/Craniectomy/Cranioplasty (Adult) CPG).   Outcome: No Change  POD#3 s/p awake L craniotomy for tumor resection. VSS. Denies pain. Neuros include: oriented to person, place, and situation. Patient is oriented to time with choices, expressive aphasia, slurred/garbled speech, R facial droop, RUE 3/5, RLE 4/5. LUE and LLE 5/5. Up to chair 3x today, ambulated in hallway, tolerated well. Up with A1, walker, gait belt. PIV saline locked. Voiding spontaneously. Lg BM. L crani incision w/sutures and dermabond, CDI. DD3 w/thins. Plan for discharge to ARU. Continue to monitor and follow POC.

## 2018-04-21 NOTE — PLAN OF CARE
Problem: Patient Care Overview  Goal: Plan of Care/Patient Progress Review  Discharge Planner SLP   Patient plan for discharge: Unknown  Current status: Recommend continue dysphagia diet level 3 with thin liquids. Pt to sit upright, take small bites/sips, and pace self with PO intake. Remind patient to take small bites/sips. Pt with moderate-severe receptive and expressive aphasia with some improvement in language abilities today. Pt endorses increase in language and communicative abilities this date.   Barriers to return to prior living situation: Communication, dysphagia, weakness  Recommendations for discharge: Inpatient rehab  Rationale for recommendations: Anticipate ongoing need for ST       Entered by: Angie Cooley 04/21/2018 1:45 PM

## 2018-04-22 ENCOUNTER — APPOINTMENT (OUTPATIENT)
Dept: OCCUPATIONAL THERAPY | Facility: CLINIC | Age: 63
End: 2018-04-22
Attending: NEUROLOGICAL SURGERY
Payer: COMMERCIAL

## 2018-04-22 ENCOUNTER — APPOINTMENT (OUTPATIENT)
Dept: PHYSICAL THERAPY | Facility: CLINIC | Age: 63
End: 2018-04-22
Attending: NEUROLOGICAL SURGERY
Payer: COMMERCIAL

## 2018-04-22 PROCEDURE — 12000001 ZZH R&B MED SURG/OB UMMC

## 2018-04-22 PROCEDURE — 97112 NEUROMUSCULAR REEDUCATION: CPT | Mod: GO | Performed by: OCCUPATIONAL THERAPIST

## 2018-04-22 PROCEDURE — 25000132 ZZH RX MED GY IP 250 OP 250 PS 637: Performed by: STUDENT IN AN ORGANIZED HEALTH CARE EDUCATION/TRAINING PROGRAM

## 2018-04-22 PROCEDURE — 25000131 ZZH RX MED GY IP 250 OP 636 PS 637: Performed by: STUDENT IN AN ORGANIZED HEALTH CARE EDUCATION/TRAINING PROGRAM

## 2018-04-22 PROCEDURE — 97112 NEUROMUSCULAR REEDUCATION: CPT | Mod: GP

## 2018-04-22 PROCEDURE — 97116 GAIT TRAINING THERAPY: CPT | Mod: GP

## 2018-04-22 PROCEDURE — 25000132 ZZH RX MED GY IP 250 OP 250 PS 637: Performed by: NURSE PRACTITIONER

## 2018-04-22 PROCEDURE — 97530 THERAPEUTIC ACTIVITIES: CPT | Mod: GO | Performed by: OCCUPATIONAL THERAPIST

## 2018-04-22 PROCEDURE — 40000193 ZZH STATISTIC PT WARD VISIT

## 2018-04-22 PROCEDURE — 97535 SELF CARE MNGMENT TRAINING: CPT | Mod: GO | Performed by: OCCUPATIONAL THERAPIST

## 2018-04-22 PROCEDURE — 40000133 ZZH STATISTIC OT WARD VISIT: Performed by: OCCUPATIONAL THERAPIST

## 2018-04-22 RX ADMIN — METOPROLOL TARTRATE 25 MG: 25 TABLET ORAL at 07:57

## 2018-04-22 RX ADMIN — METOPROLOL TARTRATE 25 MG: 25 TABLET ORAL at 19:31

## 2018-04-22 RX ADMIN — DEXAMETHASONE 4 MG: 4 TABLET ORAL at 07:55

## 2018-04-22 RX ADMIN — BUMETANIDE 1 MG: 1 TABLET ORAL at 07:56

## 2018-04-22 RX ADMIN — METOPROLOL TARTRATE 25 MG: 25 TABLET ORAL at 14:19

## 2018-04-22 RX ADMIN — DEXAMETHASONE 4 MG: 4 TABLET ORAL at 21:39

## 2018-04-22 RX ADMIN — LEVETIRACETAM 750 MG: 750 TABLET, FILM COATED ORAL at 07:56

## 2018-04-22 RX ADMIN — PANTOPRAZOLE SODIUM 40 MG: 40 TABLET, DELAYED RELEASE ORAL at 07:57

## 2018-04-22 RX ADMIN — CYCLOSPORINE 50 MG: 25 CAPSULE, LIQUID FILLED ORAL at 07:56

## 2018-04-22 RX ADMIN — CYCLOSPORINE 50 MG: 25 CAPSULE, LIQUID FILLED ORAL at 18:15

## 2018-04-22 RX ADMIN — DEXAMETHASONE 4 MG: 4 TABLET ORAL at 15:49

## 2018-04-22 RX ADMIN — LEVETIRACETAM 750 MG: 750 TABLET, FILM COATED ORAL at 19:27

## 2018-04-22 RX ADMIN — ATORVASTATIN CALCIUM 10 MG: 10 TABLET, FILM COATED ORAL at 07:56

## 2018-04-22 RX ADMIN — BUMETANIDE 1 MG: 1 TABLET ORAL at 19:27

## 2018-04-22 ASSESSMENT — VISUAL ACUITY
OU: NORMAL ACUITY

## 2018-04-22 NOTE — PLAN OF CARE
"Problem: Patient Care Overview  Goal: Plan of Care/Patient Progress Review  Outcome: Improving  /72 (BP Location: Left arm, Cuff Size: Adult Regular)  Pulse 63  Temp 98.2  F (36.8  C) (Oral)  Resp 18  Ht 1.778 m (5' 10\")  Wt 70.1 kg (154 lb 8.7 oz)  SpO2 96%  BMI 22.17 kg/m2RA.A&O x2.Pt named year but unable date and month.Whengiven month he picked April.Using call light for bathroom and moving well with assist of 1 and GB to stand and use urinal.Voiding good amounts.Denies and pain or nausea.Will continue POC.      "

## 2018-04-22 NOTE — PLAN OF CARE
Problem: Patient Care Overview  Goal: Plan of Care/Patient Progress Review  Discharge Planner PT   Patient plan for discharge: rehab   Current status: Pt ambulated 400'+ without AD, with min-CGA. Improved balance and gait mechanics today. To promote further improvement in functional mobility, pt completed dynamic balance drills, compensating appropriately with no overt LOB.   Barriers to return to prior living situation: medical needs, level of A,   Recommendations for discharge: rehab   Rationale for recommendations: pt is highly motivated with therapies, has interdisciplinary needs, continues to make progress with therapy, anticipate he could tolerate extended therapy times and believe he has supportive family. Pt has strong rehab potential.        Entered by: Kaylin Cassidy 04/22/2018 3:52 PM

## 2018-04-22 NOTE — PLAN OF CARE
Problem: Patient Care Overview  Goal: Plan of Care/Patient Progress Review  Discharge Planner OT  6A  Patient plan for discharge: Not discussed this session  Current status: Pt provided PROM and instruction of supine BL AROM exercises for affected R UE to strengthen UE and increase I participation in ADLs.  Pt provided adaptive equipment training for LB dressing, g/h, and self-feeding ADL tasks.  Pt completed functional mobility a short community distance w/ CGA, gait belt, FWW, and hand over hand A to grasp walker w/ affected R UE.      Barriers to return to prior living situation: R UE hemiplegia, aphasia, crani precautions, pt was living alone with sister to A living 30 mins away.  Recommendations for discharge: Per plan established by the OT, the recommendation for dc location is ARU.  Rationale for recommendations: Pt would benefit from continued skilled therapy to increase I/ADLs safety to prepare for return home where he lives alone.        Entered by: Federico Stevens 04/22/2018 3:34 PM

## 2018-04-22 NOTE — PLAN OF CARE
Problem: Craniotomy/Craniectomy/Cranioplasty (Adult)  Goal: Signs and Symptoms of Listed Potential Problems Will be Absent, Minimized or Managed (Craniotomy/Craniectomy/Cranioplasty)  Signs and symptoms of listed potential problems will be absent, minimized or managed by discharge/transition of care (reference Craniotomy/Craniectomy/Cranioplasty (Adult) CPG).   Outcome: Improving  POD#3 s/p awake L craniotomy for tumor resection. VSS. Denies pain. Neuros include: oriented x 4. Expressive aphasia, slurred/garbled speech, R facial droop, RUE 3/5, RLE 4/5. LUE and LLE 5/5. Up to chair 3x today, ambulated in hallway, tolerated well. Up with A1, walker, gait belt. PIV saline locked. Voiding spontaneously. L crani incision w/sutures and dermabond, CDI. Moist guaze placed on incision twice to soften dry crusts on crani incision.  DD3 w/thins. Appetite good. Plan for discharge to ARU. Continue to monitor and follow POC.

## 2018-04-22 NOTE — PROGRESS NOTES
Name: Sarabjit Calvin  Age/Sex: 62 M  Rm: [id number]  MRN: [id number]  Staff: Radha  Date of Admission:   Primary Service: Neurosurgery    HPI: 62 RH M with several week history of speech slurring associated with right handed clumsiness. The patient underwent an MRI that reportedly showed a left frontal lesion. The patient was prescribed Decadron and referred for consideration of craniotomy.    Procedures:  : Stealth Guided Left Craniotomy for Tumor Resection, Awake    Daily events:   : Had Fall in Room when attempting to get up independently; Transferred to Unit 6A    Imagin/19: MRI Brain: Edema surrounding resection cavity; Restricted diffusion in Left M2 Territory    Examination:  AOx2.   CN II - XII intact except for right lower facial droop  Moderate expressive > receptive aphasia, able to follow commands.   Strength 4-/5 in RUE with R arm neglect otherwise intact     Assessment/Plan of care: 62 year old male POD#4 from awake craniotomy for tumor resection.  Neurological status is stable.      NEURO:   - Post-Operative Pain Control  - Neuro Examination Q 4 HR  - Keppra 1000 bid for seizure ppx x 7 days ()  - Decadron for cerebral edema with brain compression   - F/U Surgical Pathology  - Added to Brain Tumor Board (Will discuss on )    CV:  - Goal SBP < 160 mmHg    PULM:   - Incentive Spirometry Q 1 HR     GI/FEN:  - Dysphagia: SLP Evaluation Completed; On Dysphagia Diet Level 3 w/ Thin Liquids   - GI Prophylaxis: Protonix 40 mg QD   - Electrolyte Replacement Protocol  - Bowel Regimen w/ Senna and Miralax    RENAL/:        - Voiding  -  Continue 0.9 NaCl 100 mL/hr    HEME/ID:  - Plts > 100k, hgb > 8, INR < 1.5   - DVT Prophylaxis: SCDs to BLE    ENDO:   -Insulin Sliding Scale with concurrent use of Dexamethasone    DISPO:  Likely ARU; Anticipate Readiness for D/C   Barriers: Pending post-op needs and PT evaluation  Activity: Up in Chair TID; Ambulate w/ Assist  PT: Evaluation  Pending  OT: ARU  SLP: ARU      Contact the neurosurgery resident on call with questions.    Dial * * * [phone]: Enter job code [phone] when prompted.    Jeremy Guzman MD  Neurosurgery PGY2

## 2018-04-23 ENCOUNTER — APPOINTMENT (OUTPATIENT)
Dept: OCCUPATIONAL THERAPY | Facility: CLINIC | Age: 63
End: 2018-04-23
Attending: NEUROLOGICAL SURGERY
Payer: COMMERCIAL

## 2018-04-23 ENCOUNTER — APPOINTMENT (OUTPATIENT)
Dept: PHYSICAL THERAPY | Facility: CLINIC | Age: 63
End: 2018-04-23
Attending: NEUROLOGICAL SURGERY
Payer: COMMERCIAL

## 2018-04-23 ENCOUNTER — APPOINTMENT (OUTPATIENT)
Dept: SPEECH THERAPY | Facility: CLINIC | Age: 63
End: 2018-04-23
Attending: NEUROLOGICAL SURGERY
Payer: COMMERCIAL

## 2018-04-23 LAB — CA-I SERPL ISE-MCNC: 4.2 MG/DL (ref 4.4–5.2)

## 2018-04-23 PROCEDURE — 40000225 ZZH STATISTIC SLP WARD VISIT

## 2018-04-23 PROCEDURE — 97116 GAIT TRAINING THERAPY: CPT | Mod: GP

## 2018-04-23 PROCEDURE — 97112 NEUROMUSCULAR REEDUCATION: CPT | Mod: GP

## 2018-04-23 PROCEDURE — 25000128 H RX IP 250 OP 636: Performed by: NURSE PRACTITIONER

## 2018-04-23 PROCEDURE — 97530 THERAPEUTIC ACTIVITIES: CPT | Mod: GO

## 2018-04-23 PROCEDURE — 40000133 ZZH STATISTIC OT WARD VISIT

## 2018-04-23 PROCEDURE — 25000132 ZZH RX MED GY IP 250 OP 250 PS 637: Performed by: STUDENT IN AN ORGANIZED HEALTH CARE EDUCATION/TRAINING PROGRAM

## 2018-04-23 PROCEDURE — 36415 COLL VENOUS BLD VENIPUNCTURE: CPT | Performed by: NURSE PRACTITIONER

## 2018-04-23 PROCEDURE — 25000132 ZZH RX MED GY IP 250 OP 250 PS 637: Performed by: NURSE PRACTITIONER

## 2018-04-23 PROCEDURE — 97112 NEUROMUSCULAR REEDUCATION: CPT | Mod: GO

## 2018-04-23 PROCEDURE — 97530 THERAPEUTIC ACTIVITIES: CPT | Mod: GP

## 2018-04-23 PROCEDURE — 12000008 ZZH R&B INTERMEDIATE UMMC

## 2018-04-23 PROCEDURE — 92526 ORAL FUNCTION THERAPY: CPT | Mod: GN

## 2018-04-23 PROCEDURE — 82330 ASSAY OF CALCIUM: CPT | Performed by: NURSE PRACTITIONER

## 2018-04-23 PROCEDURE — 25000131 ZZH RX MED GY IP 250 OP 636 PS 637: Performed by: STUDENT IN AN ORGANIZED HEALTH CARE EDUCATION/TRAINING PROGRAM

## 2018-04-23 PROCEDURE — 40000193 ZZH STATISTIC PT WARD VISIT

## 2018-04-23 PROCEDURE — 92507 TX SP LANG VOICE COMM INDIV: CPT | Mod: GN

## 2018-04-23 RX ORDER — DEXAMETHASONE 4 MG/1
4 TABLET ORAL EVERY 8 HOURS SCHEDULED
Status: DISCONTINUED | OUTPATIENT
Start: 2018-04-23 | End: 2018-04-23

## 2018-04-23 RX ADMIN — METOPROLOL TARTRATE 25 MG: 25 TABLET ORAL at 20:05

## 2018-04-23 RX ADMIN — DEXAMETHASONE 1 MG: 1 TABLET ORAL at 21:15

## 2018-04-23 RX ADMIN — CALCIUM GLUCONATE 1 G: 98 INJECTION, SOLUTION INTRAVENOUS at 13:33

## 2018-04-23 RX ADMIN — PANTOPRAZOLE SODIUM 40 MG: 40 TABLET, DELAYED RELEASE ORAL at 08:03

## 2018-04-23 RX ADMIN — Medication 1 MG: at 23:44

## 2018-04-23 RX ADMIN — SENNOSIDES AND DOCUSATE SODIUM 3 TABLET: 8.6; 5 TABLET ORAL at 08:02

## 2018-04-23 RX ADMIN — METOPROLOL TARTRATE 25 MG: 25 TABLET ORAL at 13:33

## 2018-04-23 RX ADMIN — DEXAMETHASONE 2 MG: 1 TABLET ORAL at 06:54

## 2018-04-23 RX ADMIN — CYCLOSPORINE 50 MG: 25 CAPSULE, LIQUID FILLED ORAL at 17:51

## 2018-04-23 RX ADMIN — LEVETIRACETAM 750 MG: 750 TABLET, FILM COATED ORAL at 08:02

## 2018-04-23 RX ADMIN — METOPROLOL TARTRATE 25 MG: 25 TABLET ORAL at 08:03

## 2018-04-23 RX ADMIN — ATORVASTATIN CALCIUM 10 MG: 10 TABLET, FILM COATED ORAL at 08:03

## 2018-04-23 RX ADMIN — LEVETIRACETAM 750 MG: 750 TABLET, FILM COATED ORAL at 20:06

## 2018-04-23 RX ADMIN — CYCLOSPORINE 50 MG: 25 CAPSULE, LIQUID FILLED ORAL at 08:03

## 2018-04-23 RX ADMIN — SENNOSIDES AND DOCUSATE SODIUM 1 TABLET: 8.6; 5 TABLET ORAL at 20:05

## 2018-04-23 RX ADMIN — BUMETANIDE 1 MG: 1 TABLET ORAL at 08:02

## 2018-04-23 RX ADMIN — BUMETANIDE 1 MG: 1 TABLET ORAL at 20:04

## 2018-04-23 RX ADMIN — DEXAMETHASONE 1 MG: 1 TABLET ORAL at 21:09

## 2018-04-23 RX ADMIN — DEXAMETHASONE 2 MG: 1 TABLET ORAL at 13:33

## 2018-04-23 ASSESSMENT — VISUAL ACUITY
OU: NORMAL ACUITY

## 2018-04-23 NOTE — PROGRESS NOTES
Social Work Services Progress Note    Hospital Day: 6  Date of Initial Social Work Evaluation:  4/20/18  Collaborated with:  Tammy Osorio NP Neuro Surgery, Admissions at [facility] ([facility]), pt and ji (Jessie)    Data:  Tammy Osorio NP, has communicated readiness for discharge.  An acute rehab stay is recommended.    Intervention:  DEANN spoke with Tammy Osorio NP.  DEANN spoke with Admissions ([facility] [phone]) at [facility].  [facility] states that they are seeking [employer] authorization for pt's acute rehab stay.  [facility] states that [employer] has requested more current therapy notes.  DEANN faxed all of pt's speech, occupational and physical therapy progress notes to [facility].  DEANN updated pt and nichioma (Jessie).  Jessie states that she will provide pt's transport to the accepting facility.  DEANN updated pt.    Assessment: Pt voices understanding of the discharge plan and agreement with the discharge plan.    Plan:    Anticipated Disposition:  Acute Rehab placement at [facility]    Barriers to d/c plan:  [facility] is awaiting [employer] authorization.    Follow Up:  DEANN will continue to follow.    NAEEM Ramos  Social Work, 6A  Phone:  [phone]  Pager:  [phone]  4/23/2018

## 2018-04-23 NOTE — PLAN OF CARE
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  Pt provided PROM for R UE for neuromuscular re-education to increase function for I/ADLs.  Pt completed functional mobility a community distance w/ CGA, GB, and FWW for safety 2/2 decreased balance.  Pt participated in game at bedside to improve cognition necessary for I/ADLs and IADLs, [place] assist provided to incorporate affected R UE, and v/c provided for game strategy.     Barriers to return to prior living situation: R UE hemiplegia, post surgical precautions, aphasia, cognition.  Recommendations for discharge: Per plan established by the OT, the recommendation for dc location is ARU.  Rationale for recommendations: Pt motivated and would benefit from continued skilled therapy to return to IND baseline in ADLs and IADLs.       Entered by: Federico Stevens 04/23/2018 2:58 PM

## 2018-04-23 NOTE — PROGRESS NOTES
Social Work Services Progress Note    Hospital Day: 6  Date of Initial Social Work Evaluation:  4/20/18  Collaborated with:  Admissions at [facility] in [place] ([facility]), Tammy Osorio NP, Dr. Terry, pt and niece    Data:  SW received a call from Admissions at [facility] in [place] ([facility]) indicating that [employer] gave 7 day authorization for ARU stay beginning tomorrow.    As it is an 8 hour drive, [facility] would like pt to leave [facility] by 7am tomorrow for transport to [facility].   [facility] has also requested that pt be screened for MRSA/VRE prior to discharge.       Intervention:  SW spoke with Admissions at [facility] in [place] ([facility]).  SW spoke with Tammy Osorio NP and Dr. Terry who indicates that orders and summary will be completed by 7am tomorrow and a MRSA/VRE screen will be ordered.  SW updated pt's niece (Jessie) and she will pick pt up at 7am tomorrow.  SW  Updated pt.    Assessment:  Pt has been assessed and approved for admit to [facility]    Plan:    Anticipated Disposition:  Acute Rehab placement at [facility]    Barriers to d/c plan:  None    Follow Up:  SW will coordinate discharge.    NEAEM Ramos  Social Work, 6A  Phone:  [phone]  Pager:  [phone]  4/24/2018

## 2018-04-23 NOTE — PLAN OF CARE
Problem: Patient Care Overview  Goal: Plan of Care/Patient Progress Review  Pt is POD#5 s/p L craniotomy for tumor resection. VSS. Pt is A&Ox4 with choices. Neuros unchanged; expressive aphasia (showing improvement when taking it slow), slurred/garbled speech, R facial droop, RUE 3/5, RLE 4/5 and L side 5/5. Denies pain. L crani incision MYRIAM. Up with assist of 1 and GB.  PIV SL. Voiding spont. Tolerating DD3 w/thins. At times, pt frequently moved from bed to chair. Plan for discharge to ARU when bed availability (Social Work said a facility has been called, just waiting to hear back). Continue to monitor and follow POC.

## 2018-04-23 NOTE — PLAN OF CARE
Problem: Patient Care Overview  Goal: Plan of Care/Patient Progress Review  Discharge Planner PT   Patient plan for discharge: rehab   Current status: Pt performed bed mobility and supine>sit from flat bed with SBA. A provided for donning of pants, socks and shoes 2/2 dec coordination/strength of R UE. Cleared with RN, pt ambulated from room to outside with L hand hold A and CGA-min A at gait belt. Minor LOB requiring inc A to re-establish balance. Performed neuro-re.ed/coordination for R UE - good tricep, activation of pronation/supination, min-no biceps, wrist ext/fex, finger ext/flex.   Barriers to return to prior living situation: medical needs, level of A, current mobility   Recommendations for discharge: ARU   Rationale for recommendations: Pt would benefit from continued skilled PT to address deficits in motor control, coordination, balance and activity tolerance. Pt is highly motivated, has supportive family, interdisciplinary needs, and continues to make daily gains with therapy.        Entered by: Kaylin Cassidy 04/23/2018 11:46 AM

## 2018-04-23 NOTE — PLAN OF CARE
Problem: Patient Care Overview  Goal: Plan of Care/Patient Progress Review    Discharge Planner SLP   Patient plan for discharge: seeking rehab, discussing with SW  Current status: Pt demonstrates improved safety with PO intake. Recommending advane to regular diet and thin liquids with use of safe swallow strategies as follows: upright, small bites/sips, alternating liquids/solids every 2-3 bites. Aphasia continues to improve, though significantly limiting communication/safety.  Barriers to return to prior living situation: aphasia, weakness, impulsivity  Recommendations for discharge: ARU  Rationale for recommendations: pt demonstrates need for multidisciplinary skilled intervention and is motivated in therapies; pt below baseline and has strong family support       Entered by: Batool De La Torre 04/23/2018 12:37 PM

## 2018-04-23 NOTE — PROGRESS NOTES
Social Work Services Discharge Note      Patient Name:  Sarabjit Calvin     Anticipated Discharge Date:  4/24/18    Discharge Disposition:   [facility] ([phone])    Following MD:  Facility Assignment     Pre-Admission Screening (PAS) online form has been completed.  The Level of Care (LOC) is:  Determined  Confirmation Code is:  Not required as pt is being admitted to acute rehab unit.    Additional Services/Equipment Arranged:  Tammy Osorio NP Neuro Surgery has confirmed readiness for discharge.  Admissions ([facility]) at [facility] has confirmed acceptance as well as receipt of insurance authorization.  Pt's niece (Jessie) will provide pt's transport to [facility] on 4/24/18 at 7am.     Patient / Family response to discharge plan:  Pt and niece voice understanding of the discharge plan and agreement with the discharge plan.     Persons notified of above discharge plan:  Pt, ji, Dr. Terry, Tammy Osorio NP and 6A nursing.    Staff Discharge Instructions:  Please fax discharge orders and signed hard scripts for any controlled substances (SW will complete this task).  Please print a packet and send with patient.     CTS Handoff completed:  NO, as no PCP listed on Admissions Facesheet    Medicare Notice of Rights provided to the patient/family:  NO, as pt is [employer]  Primary per Admission Face Sheet.    NAEEM Ramos  Social Work, 6A  Phone:  [phone]  Pager:  [phone]  4/24/2018

## 2018-04-23 NOTE — PLAN OF CARE
Problem: Patient Care Overview  Goal: Plan of Care/Patient Progress Review  Outcome: Improving  Pt is POD#5 s/p awake L craniotomy for tumor resection. VSS. Pt is A&Ox4 with choices. Neuros unchanged; expressive aphasia, slurred/garbled speech, R facial droop, RUE 3/5, RLE 4/5 and L side 5/5. Denies pain. L crani incision MYRIAM. Up with assist of 1 and GB.  PIV SL. Voiding spontaneously. Tolerating DD3 w/thins. Pt did not sleep well overnight, pt seemed anxious, frequently moved from bed to chair. Plan for discharge to ARU. Continue to monitor and follow POC.

## 2018-04-23 NOTE — PROGRESS NOTES
Name: Sarabjit Calvin  Age/Sex: 62 M  Rm: 6212-02  MRN: [id number]  Staff: Radha  Date of Admission:   Primary Service: Neurosurgery    HPI: 62 RH M with several week history of speech slurring associated with right handed clumsiness. The patient underwent an MRI that reportedly showed a left frontal lesion. The patient was prescribed Decadron and referred for consideration of craniotomy.    Procedures:  : Stealth Guided Left Craniotomy for Tumor Resection, Awake    Daily events:   : Surgery as above. Mild expressive and receptive aphasia post-op.  : MRI Brain Completed; Speech Evaluation Completed: Dysphagia Diet Level 3 w/ Thin Liquids  : Had Fall in Room when attempting to get up independently; Transferred to Unit 6A  : No significant events today.    Imagin/19: MRI Brain: Edema surrounding resection cavity; Restricted diffusion in Left M2 Territory    Examination:  AOx2.   CN II - XII intact except for right lower facial droop  Moderate expressive and mild  receptive aphasia, able to follow commands.   Strength 4-/5 in RUE with R arm neglect otherwise intact     Assessment/Plan of care: 62 year old male POD#5 from awake craniotomy for tumor resection.  Neurological status is stable.      NEURO:   - Post-Operative Pain Control  - Neuro Examination Q 4 HR  - Keppra 1000 bid for seizure ppx x 7 days ()  - Decadron for cerebral edema with brain compression; taper to 2 BID  - F/U Surgical Pathology  - Added to Brain Tumor Board (Will discuss on )    CV:  - Goal SBP < 160 mmHg    PULM:   - Incentive Spirometry Q 1 HR     GI/FEN:  - Dysphagia: SLP Evaluation Completed; On Dysphagia Diet Level 3 w/ Thin Liquids   - GI Prophylaxis: Protonix 40 mg QD   - Electrolyte Replacement Protocol  - Bowel Regimen w/ Senna and Miralax    RENAL/:        - Voiding  -  Continue 0.9 NaCl 100 mL/hr    HEME/ID:  - Plts > 100k, hgb > 8, INR < 1.5   - DVT Prophylaxis: SCDs to BLE    ENDO:    -Insulin Sliding Scale with concurrent use of Dexamethasone    DISPO:  Likely ARU; Anticipate Readiness for D/C 4/22  Barriers: Pending post-op needs and PT evaluation  Activity: Up in Chair TID; Ambulate w/ Assist  PT: Evaluation Pending  OT: ARU  SLP: ARU      Contact the neurosurgery resident on call with questions.    Dial * * * [phone]: Enter job code [phone] when prompted.    Jeremy Guzman MD  Neurosurgery PGY2

## 2018-04-24 VITALS
HEIGHT: 70 IN | SYSTOLIC BLOOD PRESSURE: 165 MMHG | HEART RATE: 62 BPM | WEIGHT: 154.54 LBS | DIASTOLIC BLOOD PRESSURE: 86 MMHG | TEMPERATURE: 98.4 F | RESPIRATION RATE: 16 BRPM | BODY MASS INDEX: 22.12 KG/M2 | OXYGEN SATURATION: 99 %

## 2018-04-24 PROBLEM — G81.90 HEMIPARESIS (H): Status: ACTIVE | Noted: 2018-04-24

## 2018-04-24 PROBLEM — R47.01 APHASIA: Status: ACTIVE | Noted: 2018-04-24

## 2018-04-24 PROBLEM — C71.9 GLIOBLASTOMA MULTIFORME (H): Status: ACTIVE | Noted: 2018-04-18

## 2018-04-24 LAB
MRSA DNA SPEC QL NAA+PROBE: NEGATIVE
SPECIMEN SOURCE: NORMAL

## 2018-04-24 PROCEDURE — 25000132 ZZH RX MED GY IP 250 OP 250 PS 637: Performed by: STUDENT IN AN ORGANIZED HEALTH CARE EDUCATION/TRAINING PROGRAM

## 2018-04-24 PROCEDURE — 87640 STAPH A DNA AMP PROBE: CPT | Performed by: STUDENT IN AN ORGANIZED HEALTH CARE EDUCATION/TRAINING PROGRAM

## 2018-04-24 PROCEDURE — 25000132 ZZH RX MED GY IP 250 OP 250 PS 637: Performed by: NURSE PRACTITIONER

## 2018-04-24 PROCEDURE — 87641 MR-STAPH DNA AMP PROBE: CPT | Performed by: STUDENT IN AN ORGANIZED HEALTH CARE EDUCATION/TRAINING PROGRAM

## 2018-04-24 PROCEDURE — 25000131 ZZH RX MED GY IP 250 OP 636 PS 637: Performed by: STUDENT IN AN ORGANIZED HEALTH CARE EDUCATION/TRAINING PROGRAM

## 2018-04-24 PROCEDURE — 87081 CULTURE SCREEN ONLY: CPT | Performed by: STUDENT IN AN ORGANIZED HEALTH CARE EDUCATION/TRAINING PROGRAM

## 2018-04-24 RX ORDER — POLYETHYLENE GLYCOL 3350 17 G/17G
17 POWDER, FOR SOLUTION ORAL 3 TIMES DAILY
Qty: 7 PACKET | Refills: 0 | Status: SHIPPED | OUTPATIENT
Start: 2018-04-24 | End: 2018-04-24

## 2018-04-24 RX ORDER — DEXAMETHASONE 4 MG/1
4 TABLET ORAL EVERY 8 HOURS SCHEDULED
Status: DISCONTINUED | OUTPATIENT
Start: 2018-04-24 | End: 2018-04-24 | Stop reason: HOSPADM

## 2018-04-24 RX ORDER — CYCLOSPORINE 25 MG/1
50 CAPSULE, GELATIN COATED ORAL 2 TIMES DAILY
Qty: 100 CAPSULE | Refills: 0 | Status: SHIPPED | OUTPATIENT
Start: 2018-04-24 | End: 2018-04-24

## 2018-04-24 RX ORDER — LEVETIRACETAM 750 MG/1
750 TABLET ORAL 2 TIMES DAILY
Qty: 100 TABLET | Refills: 0 | Status: SHIPPED | OUTPATIENT
Start: 2018-04-24 | End: 2018-04-24

## 2018-04-24 RX ORDER — PANTOPRAZOLE SODIUM 40 MG/1
40 TABLET, DELAYED RELEASE ORAL EVERY MORNING
Qty: 60 TABLET | Refills: 0 | Status: SHIPPED | OUTPATIENT
Start: 2018-04-24

## 2018-04-24 RX ORDER — AMOXICILLIN 250 MG
3 CAPSULE ORAL 2 TIMES DAILY
Qty: 100 TABLET | Refills: 0 | Status: SHIPPED | OUTPATIENT
Start: 2018-04-24 | End: 2018-04-24

## 2018-04-24 RX ORDER — DEXAMETHASONE 4 MG/1
4 TABLET ORAL EVERY 8 HOURS
Qty: 100 TABLET | Refills: 0 | Status: SHIPPED | OUTPATIENT
Start: 2018-04-24 | End: 2018-04-24

## 2018-04-24 RX ORDER — CYCLOSPORINE 25 MG/1
50 CAPSULE, GELATIN COATED ORAL 2 TIMES DAILY
Qty: 100 CAPSULE | Refills: 0 | DISCHARGE
Start: 2018-05-09

## 2018-04-24 RX ORDER — LEVETIRACETAM 750 MG/1
750 TABLET ORAL 2 TIMES DAILY
Qty: 2 TABLET | Refills: 0 | Status: SHIPPED | OUTPATIENT
Start: 2018-04-24 | End: 2018-04-25

## 2018-04-24 RX ORDER — METOPROLOL TARTRATE 25 MG/1
25 TABLET, FILM COATED ORAL 3 TIMES DAILY
Qty: 60 TABLET | Refills: 0 | Status: SHIPPED | OUTPATIENT
Start: 2018-04-24

## 2018-04-24 RX ORDER — ATORVASTATIN CALCIUM 10 MG/1
10 TABLET, FILM COATED ORAL EVERY MORNING
Qty: 30 TABLET | Refills: 0 | Status: SHIPPED | OUTPATIENT
Start: 2018-04-24 | End: 2018-04-24

## 2018-04-24 RX ORDER — DEXAMETHASONE 4 MG/1
4 TABLET ORAL EVERY 8 HOURS
Qty: 100 TABLET | Refills: 0 | Status: SHIPPED | OUTPATIENT
Start: 2018-04-24

## 2018-04-24 RX ORDER — PANTOPRAZOLE SODIUM 40 MG/1
40 TABLET, DELAYED RELEASE ORAL EVERY MORNING
Qty: 30 TABLET | Refills: 0 | Status: SHIPPED | OUTPATIENT
Start: 2018-04-24 | End: 2018-04-24

## 2018-04-24 RX ORDER — METOPROLOL TARTRATE 25 MG/1
25 TABLET, FILM COATED ORAL 3 TIMES DAILY
Qty: 60 TABLET | Refills: 0 | Status: SHIPPED | OUTPATIENT
Start: 2018-04-24 | End: 2018-04-24

## 2018-04-24 RX ORDER — ATORVASTATIN CALCIUM 10 MG/1
10 TABLET, FILM COATED ORAL EVERY MORNING
Qty: 30 TABLET | Refills: 0 | DISCHARGE
Start: 2018-04-24

## 2018-04-24 RX ADMIN — PANTOPRAZOLE SODIUM 40 MG: 40 TABLET, DELAYED RELEASE ORAL at 06:11

## 2018-04-24 RX ADMIN — METOPROLOL TARTRATE 25 MG: 25 TABLET ORAL at 06:11

## 2018-04-24 RX ADMIN — LEVETIRACETAM 750 MG: 750 TABLET, FILM COATED ORAL at 06:11

## 2018-04-24 RX ADMIN — DEXAMETHASONE 4 MG: 4 TABLET ORAL at 06:11

## 2018-04-24 ASSESSMENT — VISUAL ACUITY
OU: NORMAL ACUITY
OU: NORMAL ACUITY

## 2018-04-24 NOTE — PLAN OF CARE
Problem: Patient Care Overview  Goal: Plan of Care/Patient Progress Review  Speech Language Therapy Discharge Summary    Reason for therapy discharge:    Discharged to acute rehabilitation facility.    Progress towards therapy goal(s). See goals on Care Plan in [facility] electronic health record for goal details.  Goals not met.  Barriers to achieving goals:   discharge from facility.    Therapy recommendation(s):    Continued therapy is recommended.  Rationale/Recommendations:  recommend SLP tx for dysphagia and communication.     The patient was on a regular diet and thin liquids at the time of hospital discharge.

## 2018-04-24 NOTE — PLAN OF CARE
Problem: Patient Care Overview  Goal: Plan of Care/Patient Progress Review  POD#6 s/p L craniotomy for tumor resection. VSS. Pt is A&Ox4 with choices. Neuros unchanged; expressive aphasia, slurred/garbled speech, R facial droop, RUE 3/5, RLE 4/5 and L side 5/5. Denies pain. L crani incision MYRIAM. Up with assist of 1 and GB.  PIV SL. Voiding spont, frequency, voiding Q1hr this shift.  Tolerating DD3 w/thins. Plan: d/c to ARU, Annelise, at 0700 today.

## 2018-04-24 NOTE — PLAN OF CARE
Problem: Patient Care Overview  Goal: Plan of Care/Patient Progress Review  Physical Therapy Discharge Summary    Reason for therapy discharge:    Discharged to acute rehabilitation facility.    Progress towards therapy goal(s). See goals on Care Plan in [facility] electronic health record for goal details.  Goals partially met.  Barriers to achieving goals:   discharge from facility.    Therapy recommendation(s):    Continued therapy is recommended.  Rationale/Recommendations:  ARU to improve pts endurance and strength with all functional mobiltiy.

## 2018-04-24 NOTE — PLAN OF CARE
Problem: Patient Care Overview  Goal: Plan of Care/Patient Progress Review  POD#5 s/p L craniotomy for tumor resection. VSS. Pt is A&Ox4 with choices. Neuros unchanged; expressive aphasia, slurred/garbled speech, R facial droop, RUE 3/5, RLE 4/5 and L side 5/5. Denies pain. L crani incision MYRIAM. Up with assist of 1 and GB.  PIV SL. Voiding spont, frequency, voiding Q1hr this shift.  Tolerating DD3 w/thins. Plan: d/c to ARU, [facility], at 0700 on 4/24/18.

## 2018-04-24 NOTE — PLAN OF CARE
Problem: Patient Care Overview  Goal: Plan of Care/Patient Progress Review  Occupational Therapy Discharge Summary    Reason for therapy discharge:    Discharged to acute rehabilitation facility.    Progress towards therapy goal(s). See goals on Care Plan in [facility] electronic health record for goal details.  Goals partially met.  Barriers to achieving goals:   discharge from facility.    Therapy recommendation(s):    Continued therapy is recommended.  Rationale/Recommendations:  ARU to maximize ADL I and tolerance.

## 2018-04-25 LAB
BACTERIA SPEC CULT: NORMAL
SPECIMEN SOURCE: NORMAL

## 2018-04-25 NOTE — PROGRESS NOTES
Addendum to Surgical Note     Pertaining to the awake craniotomy performed on 4/18/18, the procedure required intraoperative awake cortical mapping because of the difficulty of the case. I estimate that the level of the procedure and the length of the surgery is increased by approximately 25%.

## 2018-04-28 LAB — COPATH REPORT: NORMAL

## 2018-04-30 ENCOUNTER — DOCUMENTATION ONLY (OUTPATIENT)
Dept: OTHER | Facility: CLINIC | Age: 63
End: 2018-04-30

## 2018-04-30 PROBLEM — Z71.89 ADVANCED DIRECTIVES, COUNSELING/DISCUSSION: Chronic | Status: ACTIVE | Noted: 2018-04-30

## 2018-05-03 LAB — COPATH REPORT: NORMAL

## 2018-05-04 LAB — COPATH REPORT: NORMAL

## 2019-03-07 NOTE — IP AVS SNAPSHOT
"    [address] [facility]: [phone]                                              INTERAGENCY TRANSFER FORM - PHYSICIAN ORDERS   2018                    Hospital Admission Date: 2018  KAVITHA SHAFFER   : 1955  Sex: Male        Attending Provider: (none)    Allergies:  No Known Allergies    Infection:  None   Service:  NEUROSURGERY    Ht:  1.778 m (5' 10\")   Wt:  70.1 kg (154 lb 8.7 oz)   Admission Wt:  71.7 kg (158 lb 1.1 oz)    BMI:  22.17 kg/m 2   BSA:  1.86 m 2            Patient PCP Information     Provider PCP Type    Provider Not In System General      ED Clinical Impression     Diagnosis Description Comment Added By Time Added    Glioblastoma multiforme (H) [C71.9] Glioblastoma multiforme (H) [C71.9]  Peng Terry MD 2018  5:26 AM    Hyperlipidemia, unspecified hyperlipidemia type [E78.5] Hyperlipidemia, unspecified hyperlipidemia type [E78.5]  Peng Terry MD 2018  5:29 AM    Interstitial nephritis chronic [N11.9] Interstitial nephritis chronic [N11.9]  Peng Terry MD 2018  5:29 AM    Hypertension, unspecified type [I10] Hypertension, unspecified type [I10]  Peng Terry MD 2018  5:32 AM      Hospital Problems as of 2018              Priority Class Noted POA    Glioblastoma multiforme (H) Medium  2018 Yes    Aphasia Medium  2018 Unknown    Hemiparesis (H) Medium  2018 Unknown      Non-Hospital Problems as of 2018     None      Code Status History     Date Active Date Inactive Code Status Order ID Comments User Context    2018  5:46 AM  Full Code [id number]  Peng Terry MD Outpatient         Medication Review      START taking        Dose / Directions Comments    dexamethasone 4 MG tablet   Commonly known as:  DECADRON   Used for:  Glioblastoma multiforme (H)        Dose:  4 mg   Take 1 tablet (4 mg) by mouth every 8 hours   Quantity:  100 tablet   Refills:  0        levETIRAcetam " 750 MG tablet   Commonly known as:  KEPPRA   Used for:  Glioblastoma multiforme (H)        Dose:  750 mg   Take 1 tablet (750 mg) by mouth 2 times daily for 1 day   Quantity:  2 tablet   Refills:  0        pantoprazole 40 MG EC tablet   Commonly known as:  PROTONIX   Used for:  Glioblastoma multiforme (H)        Dose:  40 mg   Take 1 tablet (40 mg) by mouth every morning   Quantity:  60 tablet   Refills:  0          CONTINUE these medications which have NOT CHANGED        Dose / Directions Comments    atorvastatin 10 MG tablet   Commonly known as:  LIPITOR   Used for:  Hyperlipidemia, unspecified hyperlipidemia type        Dose:  10 mg   Take 1 tablet (10 mg) by mouth every morning   Quantity:  30 tablet   Refills:  0        BUMETANIDE PO        Dose:  1 mg   Take 1 mg by mouth 2 times daily   Refills:  0        cycloSPORINE 25 MG capsule   Commonly known as:  sandIMMUNE   Used for:  Interstitial nephritis chronic        Dose:  50 mg   Start taking on:  5/9/2018   Take 2 capsules (50 mg) by mouth 2 times daily   Quantity:  100 capsule   Refills:  0        metoprolol tartrate 25 MG tablet   Commonly known as:  LOPRESSOR   Used for:  Hypertension, unspecified type        Dose:  25 mg   Take 1 tablet (25 mg) by mouth 3 times daily   Quantity:  60 tablet   Refills:  0          STOP taking     POTASSIUM CHLORIDE PO                   Summary of Visit     Reason for your hospital stay       Surgery to remove a brain tumor             After Care     Activity - Up with nursing assistance           Additional Discharge Instructions       Follow up with Dr. Ezequiel Brown in [place] for post-operative Neurosurgical care and to establish follow up with Neuro-oncology as well as Radiation oncology.       Advance Diet as Tolerated       Follow this diet upon discharge: Regular Diet Adult       Fall precautions           General info for SNF       Length of Stay Estimate: Short Term Care: Estimated # of Days <30  Condition at  Discharge: Improving  Level of care:board and care  Rehabilitation Potential: Excellent  Admission H&P remains valid and up-to-date: Yes  Recent Chemotherapy: N/A  Use Nursing Home Standing Orders: Yes       Glucose monitor nursing POCT       Before meals and at bedtime       Mantoux instructions       Give two-step Mantoux (PPD) Per Facility Policy Yes       Wound care       Site:   Left temporal incision closed with absorbable sutures and dermabond.    Instructions:  Sutures do not need to be removed, routine evaluation of the wound for signs of infection             Referrals     Occupational Therapy Adult Consult       Evaluate and treat as clinically indicated.    Reason:  Right upper extremity weakness, hemineglect, and deconditioning s/p craniotomy for tumor resection       Onc/Heme Adult Referral       Recent diagnosis of glioblastoma (WHO IV).  Will need to establish care with local Neuro-oncologist for subsequent treatment and coordination of care.       Physical Therapy Adult Consult       Evaluate and treat as clinically indicated.    Reason:  Right upper extremity weakness, hemineglect, and deconditioning s/p craniotomy for tumor resection       Rad Oncology Referral       Recent diagnosis of Glioblastom (WHO IV), will need to establish care closer to home for evaluation and treatment       Speech Language Path Adult Consult       Evaluate and treat as clinically indicated.    Reason:  Expressive and receptive aphasia following saleem tumor resection             Follow-Up Appointment Instructions     Future Labs/Procedures    Follow Up     Comments:    Follow up with Dr. Garcia at the [facility] as needed.  Dr. Garcia's office will assist in establishing care with Neuro oncologists and Radiation oncologists in [place].      Follow up with Dr. Ezequiel Brown for post-operative neurosurgical care      Follow-Up Appointment Instructions     Follow Up       Follow up with Dr. Garcia at the [facility] as needed.  Dr. Garcia's office  will assist in establishing care with Neuro oncologists and Radiation oncologists in [place].      Follow up with Dr. Ezequiel Brown for post-operative neurosurgical care             Statement of Approval     Ordered          04/24/18 0550  I have reviewed and agree with all the recommendations and orders detailed in this document.  EFFECTIVE NOW     Approved and electronically signed by:  Peng Terry MD                no loss of consciousness, no gait abnormality, no headache, no sensory deficits, and no weakness.

## 2024-02-03 NOTE — PLAN OF CARE
Problem: Patient Care Overview  Goal: Plan of Care/Patient Progress Review  Discharge Planner PT   Patient plan for discharge: not discussed   Current status: Pt required min-mod A to ashley shorts. Performed STS with FWW and CGA. Standing balance CGA. Pt ambulated 2 x ~250' with FWW and CGA with min A for R hand placement/FWW negotiation on R side. He ambulated 2 x 40' with L hand-hold and min A at GB - with mirror for visual feedback for step-length, JESSICA width and trunk midline. Continues to display expressive aphasia though seems to be improving. PNF patterns performed to R UE.   Barriers to return to prior living situation: medical needs, lives alone, level of A, current mobility   Recommendations for discharge: ARU   Rationale for recommendations: Pt is well below baseline, would benefit from interdisciplinary rehab, highly motivated to work with therapy, and anticipate he could tolerate extended therapy times. Pt making inter and intra-session gains with good carry-over.        Entered by: Kaylin Cassidy 04/21/2018 4:03 PM            5 Hour(s) 56 Minute(s)

## 2024-06-17 PROBLEM — Z71.89 ADVANCED DIRECTIVES, COUNSELING/DISCUSSION: Status: RESOLVED | Noted: 2018-04-30 | Resolved: 2024-06-17

## (undated) DEVICE — PREP SKIN SCRUB TRAY 4461A

## (undated) DEVICE — SPONGE COTTONOID 1/2X1/2" 20-04S

## (undated) DEVICE — DRAPE SHEET REV FOLD 3/4 9349

## (undated) DEVICE — PACK CRANIOTOMY

## (undated) DEVICE — SOL RINGERS LACTATED 1000ML BAG 07953-09

## (undated) DEVICE — CRANIOTOME ADULT ANSPACH A-CRN

## (undated) DEVICE — NDL ANGIOCATH 14GA 1.25" 4048

## (undated) DEVICE — PERFORATOR 14MM CODMAN

## (undated) DEVICE — BASIN SET MINOR DISP

## (undated) DEVICE — SOL WATER IRRIG 1000ML BOTTLE 2F7114

## (undated) DEVICE — SPONGE COTTONOID 1/2X1 1/2" 20-06S

## (undated) DEVICE — DRAPE CRANIOTOMY W/POUCH 9450

## (undated) DEVICE — ADH FLOSEAL W/HUMAN THROMBIN 5ML W/APPLICATOR TIP ADS201844

## (undated) DEVICE — SU NUROLON 4-0 TF CR 8X18" C584D

## (undated) DEVICE — SU MONOCRYL 3-0 PS-1 27" Y936H

## (undated) DEVICE — DRAPE POUCH INSTRUMENT 1018

## (undated) DEVICE — NDL BLUNT 18GA 1.5" W/O FILTER 305180

## (undated) DEVICE — NDL 25GA 2"  8881200441

## (undated) DEVICE — RETR ELASTIC STAYS LONE STAR BLUNT DUAL LEAD 3550-1G

## (undated) DEVICE — SU VICRYL 2-0 CT-2 CR 8X18" J726D

## (undated) DEVICE — PAD CHUX UNDERPAD 23X24" 7136

## (undated) DEVICE — BUR ROUTER 1.4X12.8MM ANSPACH S-1R

## (undated) DEVICE — LINEN TOWEL PACK X30 5481

## (undated) DEVICE — SU DERMABOND ADVANCED .7ML DNX12

## (undated) DEVICE — SYR 30ML LL W/O NDL 302832

## (undated) DEVICE — SOL NACL 0.9% 10ML VIAL 0409-4888-02

## (undated) DEVICE — PREP POVIDONE IODINE SCRUB 7.5% 120ML

## (undated) DEVICE — SPONGE COTTONOID 1X3" 20-10S

## (undated) DEVICE — CATH TRAY FOLEY COUDE SURESTEP 16FR TEMP SENSING A344916

## (undated) DEVICE — PREP CHLORAPREP CLEAR 3ML 260400

## (undated) DEVICE — SUCTION MANIFOLD DORNOCH ULTRA CART UL-CL500

## (undated) DEVICE — PREP POVIDONE IODINE SOLUTION 10% 120ML

## (undated) DEVICE — COVER CAMERA VIDEO LASER 9X96" 04-CC227

## (undated) DEVICE — SYR 03ML LL W/O NDL 309657

## (undated) DEVICE — WIPES FOLEY CARE SURESTEP PROVON DFC100

## (undated) DEVICE — MARKER SPHERES PASSIVE MEDT PACK 5 8801075

## (undated) DEVICE — LABEL MEDICATION SYSTEM 3303-P

## (undated) DEVICE — SYR EAR BULB 3OZ 0035830

## (undated) DEVICE — ESU CORD BIPOLAR AND IRR TUBING AESCULAP US355

## (undated) DEVICE — SYR 10ML LL W/O NDL

## (undated) DEVICE — ESU GROUND PAD ADULT W/CORD E7507

## (undated) DEVICE — RX BACITRACIN OINTMENT 0.9G 1/32OZ CUR001109

## (undated) DEVICE — SPONGE SURGIFOAM 100 1974

## (undated) DEVICE — SPONGE COTTONOID 1/4X1/4" 20-01S

## (undated) DEVICE — LINEN TOWEL PACK X6 WHITE 5487

## (undated) DEVICE — SPONGE COTTONOID 1/2X3" 20-07S

## (undated) DEVICE — CLIP RANEY

## (undated) DEVICE — PIN SKULL MAYFIELD ADULT TITANIUM 3/PK A1120

## (undated) RX ORDER — LIDOCAINE HYDROCHLORIDE 20 MG/ML
INJECTION, SOLUTION EPIDURAL; INFILTRATION; INTRACAUDAL; PERINEURAL
Status: DISPENSED
Start: 2018-04-18

## (undated) RX ORDER — BACITRACIN 50000 [IU]/1
INJECTION, POWDER, FOR SOLUTION INTRAMUSCULAR
Status: DISPENSED
Start: 2018-04-18

## (undated) RX ORDER — SODIUM CHLORIDE 9 MG/ML
INJECTION, SOLUTION INTRAVENOUS
Status: DISPENSED
Start: 2018-04-18

## (undated) RX ORDER — PROPOFOL 10 MG/ML
INJECTION, EMULSION INTRAVENOUS
Status: DISPENSED
Start: 2018-04-18

## (undated) RX ORDER — LABETALOL HYDROCHLORIDE 5 MG/ML
INJECTION, SOLUTION INTRAVENOUS
Status: DISPENSED
Start: 2018-04-18

## (undated) RX ORDER — CEFAZOLIN SODIUM 2 G/100ML
INJECTION, SOLUTION INTRAVENOUS
Status: DISPENSED
Start: 2018-04-18

## (undated) RX ORDER — FENTANYL CITRATE 50 UG/ML
INJECTION, SOLUTION INTRAMUSCULAR; INTRAVENOUS
Status: DISPENSED
Start: 2018-04-18

## (undated) RX ORDER — ACETAMINOPHEN 325 MG/1
TABLET ORAL
Status: DISPENSED
Start: 2018-04-18

## (undated) RX ORDER — BUPIVACAINE HYDROCHLORIDE AND EPINEPHRINE 5; 5 MG/ML; UG/ML
INJECTION, SOLUTION EPIDURAL; INTRACAUDAL; PERINEURAL
Status: DISPENSED
Start: 2018-04-18

## (undated) RX ORDER — METOPROLOL TARTRATE 25 MG/1
TABLET, FILM COATED ORAL
Status: DISPENSED
Start: 2018-04-18

## (undated) RX ORDER — DEXAMETHASONE SODIUM PHOSPHATE 4 MG/ML
INJECTION, SOLUTION INTRA-ARTICULAR; INTRALESIONAL; INTRAMUSCULAR; INTRAVENOUS; SOFT TISSUE
Status: DISPENSED
Start: 2018-04-18

## (undated) RX ORDER — GLYCOPYRROLATE 0.2 MG/ML
INJECTION, SOLUTION INTRAMUSCULAR; INTRAVENOUS
Status: DISPENSED
Start: 2018-04-18

## (undated) RX ORDER — HYDRALAZINE HYDROCHLORIDE 20 MG/ML
INJECTION INTRAMUSCULAR; INTRAVENOUS
Status: DISPENSED
Start: 2018-04-18